# Patient Record
Sex: FEMALE | Race: WHITE | Employment: UNEMPLOYED | URBAN - METROPOLITAN AREA
[De-identification: names, ages, dates, MRNs, and addresses within clinical notes are randomized per-mention and may not be internally consistent; named-entity substitution may affect disease eponyms.]

---

## 2023-02-23 LAB
6-ACETYLMORPHINE: NORMAL
7-AMINOCLONAZEPAM: NORMAL
ALPHA-HYDROXYALPRAZOLAM: NORMAL
ALPHA-HYDROXYMIDAZOLAM: NORMAL
ALPRAZOLAM: NORMAL
AMPHETAMINE (PRESENCE) IN URINE BY SCREEN METHOD: NORMAL
AMPHETAMINES,URINE: NORMAL
BARBITURATES PRESENCE IN URINE BY SCREEN METHOD: NORMAL
BENZODIAZEPINE (PRESENCE) IN URINE BY SCREEN METHOD: NORMAL
BUPRENORPHINE ,URINE: NORMAL
BUPRENORPHINE GLUC, URINE: NORMAL
CANNABINOIDS IN URINE BY SCREEN METHOD: NORMAL
CHLORDIAZEPOXIDE: NORMAL
CLONAZEPAM: NORMAL
COCAINE (PRESENCE) IN URINE BY SCREEN METHOD: NORMAL
CODEINE: NORMAL
DIAZEPAM: NORMAL
DRUG SCREEN COMMENT URINE: NORMAL
FENTANYL URINE: NORMAL
HYDROCODONE: NORMAL
HYDROMORPHONE: NORMAL
LORAZEPAM: NORMAL
MDA,URINE: NORMAL
MDEA,URINE: NORMAL
MDMA,UR: NORMAL
METHADONE (PRESENCE) IN URINE BY SCREEN METHOD: NORMAL
METHAMPHETAMINE QUANTITATIVE URINE: NORMAL
MIDAZOLAM: NORMAL
MORPHINE URINE: NORMAL
NALOXONE, URINE: NORMAL
NORBUPRENORPHINE GLUC,URINE: NORMAL
NORBUPRENORPHINE, URINE: NORMAL
NORDIAZEPAM: NORMAL
NORHYDROCODONE: NORMAL
NOROXYCODONE: NORMAL
NOROXYMORPHONE URINE: NORMAL
OPIATES (PRESENCE) IN URINE BY SCREEN METHOD: NORMAL
OXAZEPAM: NORMAL
OXYCODONE (PRESENCE) IN URINE BY SCREEN METHOD: NORMAL
OXYCODONE: NORMAL
OXYMORPHONE: NORMAL
PHENCYCLIDINE (PRESENCE) IN URINE BY SCREEN METHOD: NORMAL
PHENTERMINE,UR: NORMAL
TEMAZEPAM: NORMAL

## 2023-03-29 LAB
AMPHETAMINE (PRESENCE) IN URINE BY SCREEN METHOD: ABNORMAL
BARBITURATES PRESENCE IN URINE BY SCREEN METHOD: ABNORMAL
BENZODIAZEPINE (PRESENCE) IN URINE BY SCREEN METHOD: ABNORMAL
CANNABINOIDS IN URINE BY SCREEN METHOD: ABNORMAL
COCAINE (PRESENCE) IN URINE BY SCREEN METHOD: ABNORMAL
DRUG SCREEN COMMENT URINE: ABNORMAL
FENTANYL URINE: ABNORMAL
METHADONE (PRESENCE) IN URINE BY SCREEN METHOD: ABNORMAL
OPIATES (PRESENCE) IN URINE BY SCREEN METHOD: ABNORMAL
OXYCODONE (PRESENCE) IN URINE BY SCREEN METHOD: ABNORMAL
PHENCYCLIDINE (PRESENCE) IN URINE BY SCREEN METHOD: ABNORMAL

## 2023-04-03 LAB
AMPHETAMINES,URINE: >5000 NG/ML
BUPRENORPHINE ,URINE: 5 NG/ML
BUPRENORPHINE GLUC, URINE: >1000 NG/ML
MDA,URINE: <200 NG/ML
MDEA,URINE: <200 NG/ML
MDMA,UR: <200 NG/ML
METHAMPHETAMINE QUANTITATIVE URINE: 3306 NG/ML
NALOXONE, URINE: <100 NG/ML
NORBUPRENORPHINE GLUC,URINE: >1000 NG/ML
NORBUPRENORPHINE, URINE: 602 NG/ML
PHENTERMINE,UR: <200 NG/ML

## 2023-04-04 LAB
6-ACETYLMORPHINE: <25 NG/ML
7-AMINOCLONAZEPAM: <25 NG/ML
ALPHA-HYDROXYALPRAZOLAM: <25 NG/ML
ALPHA-HYDROXYMIDAZOLAM: <25 NG/ML
ALPRAZOLAM: <25 NG/ML
CHLORDIAZEPOXIDE: <25 NG/ML
CLONAZEPAM: <25 NG/ML
CODEINE: <50 NG/ML
DIAZEPAM: <25 NG/ML
HYDROCODONE: <25 NG/ML
HYDROMORPHONE: <25 NG/ML
LORAZEPAM: <25 NG/ML
MIDAZOLAM: <25 NG/ML
MORPHINE URINE: <50 NG/ML
NORDIAZEPAM: <25 NG/ML
NORHYDROCODONE: <25 NG/ML
NOROXYCODONE: <25 NG/ML
OXAZEPAM: <25 NG/ML
OXYCODONE: <25 NG/ML
OXYMORPHONE: <25 NG/ML
TEMAZEPAM: <25 NG/ML

## 2023-04-12 LAB
MISCELLANEUOUS TEST RESULT: NORMAL
NAME OF SENDOUT TEST: NORMAL

## 2023-05-08 LAB
BUPRENORPHINE ,URINE: 17 NG/ML
BUPRENORPHINE GLUC, URINE: >1000 NG/ML
NALOXONE, URINE: <100 NG/ML
NORBUPRENORPHINE GLUC,URINE: >1000 NG/ML
NORBUPRENORPHINE, URINE: 415 NG/ML

## 2023-05-10 LAB
AMPHETAMINES,URINE: >5000 NG/ML
MDA,URINE: <200 NG/ML
MDEA,URINE: <200 NG/ML
MDMA,UR: <200 NG/ML
METHAMPHETAMINE QUANTITATIVE URINE: <200 NG/ML
PHENTERMINE,UR: <200 NG/ML

## 2023-06-02 PROBLEM — F11.20 OPIOID USE DISORDER, SEVERE, DEPENDENCE (MULTI): Status: ACTIVE | Noted: 2023-06-02

## 2023-06-02 PROBLEM — F90.2 ADHD (ATTENTION DEFICIT HYPERACTIVITY DISORDER), COMBINED TYPE: Status: ACTIVE | Noted: 2023-06-02

## 2023-06-02 PROBLEM — M70.61 TROCHANTERIC BURSITIS OF RIGHT HIP: Status: ACTIVE | Noted: 2023-06-02

## 2023-06-02 PROBLEM — F99 INSOMNIA DUE TO OTHER MENTAL DISORDER: Status: ACTIVE | Noted: 2023-06-02

## 2023-06-02 PROBLEM — M25.859 FEMORAL ACETABULAR IMPINGEMENT: Status: ACTIVE | Noted: 2023-06-02

## 2023-06-02 PROBLEM — F17.200 MODERATE TOBACCO USE DISORDER: Status: ACTIVE | Noted: 2023-06-02

## 2023-06-02 PROBLEM — F51.05 INSOMNIA DUE TO OTHER MENTAL DISORDER: Status: ACTIVE | Noted: 2023-06-02

## 2023-06-02 PROBLEM — M25.559 JOINT PAIN, HIP: Status: ACTIVE | Noted: 2023-06-02

## 2023-07-03 LAB
6-ACETYLMORPHINE: <25 NG/ML
7-AMINOCLONAZEPAM: <25 NG/ML
ALPHA-HYDROXYALPRAZOLAM: <25 NG/ML
ALPHA-HYDROXYMIDAZOLAM: <25 NG/ML
ALPRAZOLAM: <25 NG/ML
AMPHETAMINE (PRESENCE) IN URINE BY SCREEN METHOD: ABNORMAL
AMPHETAMINES,URINE: >5000 NG/ML
BARBITURATES PRESENCE IN URINE BY SCREEN METHOD: ABNORMAL
BUPRENORPHINE ,URINE: 3 NG/ML
BUPRENORPHINE GLUC, URINE: 430 NG/ML
CANNABINOIDS IN URINE BY SCREEN METHOD: ABNORMAL
CHLORDIAZEPOXIDE: <25 NG/ML
CLONAZEPAM: <25 NG/ML
COCAINE (PRESENCE) IN URINE BY SCREEN METHOD: ABNORMAL
CODEINE: <50 NG/ML
CREATINE, URINE FOR DRUG: 109.6 MG/DL
DIAZEPAM: <25 NG/ML
DRUG SCREEN COMMENT URINE: ABNORMAL
EDDP: <25 NG/ML
FENTANYL CONFIRMATION, URINE: <2.5 NG/ML
HYDROCODONE: <25 NG/ML
HYDROMORPHONE: <25 NG/ML
LORAZEPAM: <25 NG/ML
MDA,URINE: <200 NG/ML
MDEA,URINE: <200 NG/ML
MDMA,UR: <200 NG/ML
METHADONE CONFIRMATION,URINE: <25 NG/ML
METHAMPHETAMINE QUANTITATIVE URINE: <200 NG/ML
MIDAZOLAM: <25 NG/ML
MORPHINE URINE: <50 NG/ML
NALOXONE, URINE: <100 NG/ML
NORBUPRENORPHINE GLUC,URINE: >1000 NG/ML
NORBUPRENORPHINE, URINE: 448 NG/ML
NORDIAZEPAM: <25 NG/ML
NORFENTANYL: <2.5 NG/ML
NORHYDROCODONE: <25 NG/ML
NOROXYCODONE: <25 NG/ML
O-DESMETHYLTRAMADOL: <50 NG/ML
OXAZEPAM: <25 NG/ML
OXYCODONE: <25 NG/ML
OXYMORPHONE: <25 NG/ML
PHENCYCLIDINE (PRESENCE) IN URINE BY SCREEN METHOD: ABNORMAL
PHENTERMINE,UR: <200 NG/ML
TEMAZEPAM: <25 NG/ML
TRAMADOL: <50 NG/ML
ZOLPIDEM METABOLITE (ZCA): <25 NG/ML
ZOLPIDEM: <25 NG/ML

## 2023-09-27 LAB
6-ACETYLMORPHINE: <25 NG/ML
7-AMINOCLONAZEPAM: <25 NG/ML
ALPHA-HYDROXYALPRAZOLAM: <25 NG/ML
ALPHA-HYDROXYMIDAZOLAM: <25 NG/ML
ALPRAZOLAM: <25 NG/ML
AMPHETAMINE (PRESENCE) IN URINE BY SCREEN METHOD: ABNORMAL
BARBITURATES PRESENCE IN URINE BY SCREEN METHOD: ABNORMAL
CANNABINOIDS IN URINE BY SCREEN METHOD: ABNORMAL
CHLORDIAZEPOXIDE: <25 NG/ML
CLONAZEPAM: <25 NG/ML
COCAINE (PRESENCE) IN URINE BY SCREEN METHOD: ABNORMAL
CODEINE: <50 NG/ML
CREATINE, URINE FOR DRUG: 106 MG/DL
DIAZEPAM: <25 NG/ML
DRUG SCREEN COMMENT URINE: ABNORMAL
EDDP: <25 NG/ML
FENTANYL CONFIRMATION, URINE: <2.5 NG/ML
HYDROCODONE: <25 NG/ML
HYDROMORPHONE: <25 NG/ML
LORAZEPAM: <25 NG/ML
METHADONE CONFIRMATION,URINE: <25 NG/ML
MIDAZOLAM: <25 NG/ML
MORPHINE URINE: <50 NG/ML
NORDIAZEPAM: <25 NG/ML
NORFENTANYL: <2.5 NG/ML
NORHYDROCODONE: <25 NG/ML
NOROXYCODONE: <25 NG/ML
O-DESMETHYLTRAMADOL: <50 NG/ML
OXAZEPAM: <25 NG/ML
OXYCODONE: <25 NG/ML
OXYMORPHONE: <25 NG/ML
PHENCYCLIDINE (PRESENCE) IN URINE BY SCREEN METHOD: ABNORMAL
TEMAZEPAM: <25 NG/ML
TRAMADOL: <50 NG/ML
ZOLPIDEM METABOLITE (ZCA): <25 NG/ML
ZOLPIDEM: <25 NG/ML

## 2023-11-10 DIAGNOSIS — F11.20 OPIOID USE DISORDER, SEVERE, DEPENDENCE (MULTI): ICD-10-CM

## 2023-11-10 DIAGNOSIS — F90.2 ADHD (ATTENTION DEFICIT HYPERACTIVITY DISORDER), COMBINED TYPE: ICD-10-CM

## 2023-11-10 RX ORDER — DEXTROAMPHETAMINE SACCHARATE, AMPHETAMINE ASPARTATE, DEXTROAMPHETAMINE SULFATE AND AMPHETAMINE SULFATE 7.5; 7.5; 7.5; 7.5 MG/1; MG/1; MG/1; MG/1
30 TABLET ORAL
Qty: 60 TABLET | Refills: 0 | Status: SHIPPED | OUTPATIENT
Start: 2023-11-22 | End: 2023-11-10 | Stop reason: DRUGHIGH

## 2023-11-10 RX ORDER — DEXTROAMPHETAMINE SACCHARATE, AMPHETAMINE ASPARTATE, DEXTROAMPHETAMINE SULFATE AND AMPHETAMINE SULFATE 7.5; 7.5; 7.5; 7.5 MG/1; MG/1; MG/1; MG/1
30 TABLET ORAL
Qty: 30 TABLET | Refills: 0 | Status: SHIPPED | OUTPATIENT
Start: 2024-01-17 | End: 2024-03-06 | Stop reason: WASHOUT

## 2023-11-10 RX ORDER — DEXTROAMPHETAMINE SACCHARATE, AMPHETAMINE ASPARTATE, DEXTROAMPHETAMINE SULFATE AND AMPHETAMINE SULFATE 7.5; 7.5; 7.5; 7.5 MG/1; MG/1; MG/1; MG/1
30 TABLET ORAL
Qty: 30 TABLET | Refills: 0 | Status: SHIPPED | OUTPATIENT
Start: 2023-12-20 | End: 2024-03-06 | Stop reason: WASHOUT

## 2023-11-10 RX ORDER — NALOXONE HYDROCHLORIDE 4 MG/.1ML
4 SPRAY NASAL AS NEEDED
Qty: 2 EACH | Refills: 0 | Status: SHIPPED | OUTPATIENT
Start: 2023-11-10 | End: 2024-02-09 | Stop reason: WASHOUT

## 2023-11-10 RX ORDER — BUPRENORPHINE HYDROCHLORIDE, NALOXONE HYDROCHLORIDE 8; 2 MG/1; MG/1
1 FILM, SOLUBLE BUCCAL; SUBLINGUAL 3 TIMES DAILY
Qty: 90 FILM | Refills: 2 | Status: SHIPPED | OUTPATIENT
Start: 2023-12-02 | End: 2024-02-13 | Stop reason: SDUPTHER

## 2023-11-10 RX ORDER — DEXTROAMPHETAMINE SACCHARATE, AMPHETAMINE ASPARTATE, DEXTROAMPHETAMINE SULFATE AND AMPHETAMINE SULFATE 7.5; 7.5; 7.5; 7.5 MG/1; MG/1; MG/1; MG/1
30 TABLET ORAL
Qty: 30 TABLET | Refills: 0 | Status: SHIPPED | OUTPATIENT
Start: 2023-11-22 | End: 2024-02-13 | Stop reason: SDUPTHER

## 2023-11-10 RX ORDER — DEXTROAMPHETAMINE SACCHARATE, AMPHETAMINE ASPARTATE MONOHYDRATE, DEXTROAMPHETAMINE SULFATE AND AMPHETAMINE SULFATE 7.5; 7.5; 7.5; 7.5 MG/1; MG/1; MG/1; MG/1
30 CAPSULE, EXTENDED RELEASE ORAL EVERY MORNING
Qty: 30 CAPSULE | Refills: 0 | Status: SHIPPED | OUTPATIENT
Start: 2023-11-22 | End: 2024-02-13 | Stop reason: SDUPTHER

## 2023-11-10 RX ORDER — DEXTROAMPHETAMINE SACCHARATE, AMPHETAMINE ASPARTATE, DEXTROAMPHETAMINE SULFATE AND AMPHETAMINE SULFATE 7.5; 7.5; 7.5; 7.5 MG/1; MG/1; MG/1; MG/1
30 TABLET ORAL
Qty: 60 TABLET | Refills: 0 | Status: SHIPPED | OUTPATIENT
Start: 2024-01-17 | End: 2023-11-10 | Stop reason: DRUGHIGH

## 2023-11-10 RX ORDER — DEXTROAMPHETAMINE SACCHARATE, AMPHETAMINE ASPARTATE MONOHYDRATE, DEXTROAMPHETAMINE SULFATE AND AMPHETAMINE SULFATE 7.5; 7.5; 7.5; 7.5 MG/1; MG/1; MG/1; MG/1
30 CAPSULE, EXTENDED RELEASE ORAL EVERY MORNING
Qty: 30 CAPSULE | Refills: 0 | Status: SHIPPED | OUTPATIENT
Start: 2024-01-17 | End: 2024-06-05 | Stop reason: WASHOUT

## 2023-11-10 RX ORDER — DEXTROAMPHETAMINE SACCHARATE, AMPHETAMINE ASPARTATE, DEXTROAMPHETAMINE SULFATE AND AMPHETAMINE SULFATE 7.5; 7.5; 7.5; 7.5 MG/1; MG/1; MG/1; MG/1
30 TABLET ORAL
Qty: 60 TABLET | Refills: 0 | Status: SHIPPED | OUTPATIENT
Start: 2023-12-20 | End: 2023-11-10 | Stop reason: DRUGHIGH

## 2023-11-10 RX ORDER — DEXTROAMPHETAMINE SACCHARATE, AMPHETAMINE ASPARTATE MONOHYDRATE, DEXTROAMPHETAMINE SULFATE AND AMPHETAMINE SULFATE 7.5; 7.5; 7.5; 7.5 MG/1; MG/1; MG/1; MG/1
30 CAPSULE, EXTENDED RELEASE ORAL EVERY MORNING
Qty: 30 CAPSULE | Refills: 0 | Status: SHIPPED | OUTPATIENT
Start: 2023-12-20 | End: 2024-03-06 | Stop reason: WASHOUT

## 2023-11-10 RX ORDER — BUPRENORPHINE HYDROCHLORIDE, NALOXONE HYDROCHLORIDE 8; 2 MG/1; MG/1
1 FILM, SOLUBLE BUCCAL; SUBLINGUAL 3 TIMES DAILY
COMMUNITY
End: 2023-11-10 | Stop reason: SDUPTHER

## 2023-11-25 ENCOUNTER — HOSPITAL ENCOUNTER (EMERGENCY)
Age: 43
Discharge: HOME | End: 2023-11-25
Attending: EMERGENCY MEDICINE
Payer: COMMERCIAL

## 2023-11-25 ENCOUNTER — APPOINTMENT (OUTPATIENT)
Dept: GENERAL RADIOLOGY | Age: 43
End: 2023-11-25
Payer: COMMERCIAL

## 2023-11-25 VITALS
TEMPERATURE: 97.7 F | HEART RATE: 82 BPM | SYSTOLIC BLOOD PRESSURE: 132 MMHG | HEIGHT: 65 IN | WEIGHT: 175 LBS | OXYGEN SATURATION: 100 % | DIASTOLIC BLOOD PRESSURE: 85 MMHG | BODY MASS INDEX: 29.16 KG/M2 | RESPIRATION RATE: 18 BRPM

## 2023-11-25 DIAGNOSIS — J20.9 ACUTE BRONCHITIS, UNSPECIFIED ORGANISM: Primary | ICD-10-CM

## 2023-11-25 DIAGNOSIS — N39.0 ACUTE UTI: ICD-10-CM

## 2023-11-25 LAB
BACTERIA URNS QL MICRO: ABNORMAL /HPF
BILIRUB UR QL STRIP: ABNORMAL
CLARITY UR: CLEAR
COLOR UR: YELLOW
EPI CELLS #/AREA URNS HPF: ABNORMAL /HPF
GLUCOSE UR STRIP-MCNC: NEGATIVE MG/DL
HGB UR QL STRIP: NEGATIVE
INFLUENZA A BY PCR: NEGATIVE
INFLUENZA B BY PCR: NEGATIVE
KETONES UR STRIP-MCNC: ABNORMAL MG/DL
LEUKOCYTE ESTERASE UR QL STRIP: NEGATIVE
NITRITE UR QL STRIP: POSITIVE
PH UR STRIP: 5.5 [PH] (ref 5–9)
PROT UR STRIP-MCNC: 30 MG/DL
RBC #/AREA URNS HPF: ABNORMAL /HPF (ref 0–2)
SARS-COV-2 RDRP RESP QL NAA+PROBE: NOT DETECTED
SP GR UR STRIP: >=1.03 (ref 1–1.03)
STREP GRP A PCR: NEGATIVE
URINE REFLEX TO CULTURE: ABNORMAL
UROBILINOGEN UR STRIP-ACNC: 1 E.U./DL
WBC #/AREA URNS HPF: ABNORMAL /HPF (ref 0–5)

## 2023-11-25 PROCEDURE — 87186 SC STD MICRODIL/AGAR DIL: CPT

## 2023-11-25 PROCEDURE — 71045 X-RAY EXAM CHEST 1 VIEW: CPT

## 2023-11-25 PROCEDURE — 87635 SARS-COV-2 COVID-19 AMP PRB: CPT

## 2023-11-25 PROCEDURE — 87502 INFLUENZA DNA AMP PROBE: CPT

## 2023-11-25 PROCEDURE — 81001 URINALYSIS AUTO W/SCOPE: CPT

## 2023-11-25 PROCEDURE — 87088 URINE BACTERIA CULTURE: CPT

## 2023-11-25 PROCEDURE — 99284 EMERGENCY DEPT VISIT MOD MDM: CPT

## 2023-11-25 PROCEDURE — 87651 STREP A DNA AMP PROBE: CPT

## 2023-11-25 PROCEDURE — 87086 URINE CULTURE/COLONY COUNT: CPT

## 2023-11-25 RX ORDER — GUAIFENESIN 600 MG/1
1200 TABLET, EXTENDED RELEASE ORAL 2 TIMES DAILY
Qty: 40 TABLET | Refills: 0 | Status: SHIPPED | OUTPATIENT
Start: 2023-11-25 | End: 2023-12-05

## 2023-11-25 RX ORDER — DEXAMETHASONE 4 MG/1
4 TABLET ORAL 2 TIMES DAILY WITH MEALS
Qty: 20 TABLET | Refills: 0 | Status: SHIPPED | OUTPATIENT
Start: 2023-11-25 | End: 2023-12-05

## 2023-11-25 RX ORDER — DEXTROAMPHETAMINE SACCHARATE, AMPHETAMINE ASPARTATE, DEXTROAMPHETAMINE SULFATE AND AMPHETAMINE SULFATE 7.5; 7.5; 7.5; 7.5 MG/1; MG/1; MG/1; MG/1
30 TABLET ORAL DAILY
Qty: 30 TABLET | Refills: 2 | COMMUNITY
Start: 2023-11-22 | End: 2024-02-16

## 2023-11-25 RX ORDER — AZITHROMYCIN 250 MG/1
TABLET, FILM COATED ORAL
Qty: 1 PACKET | Refills: 0 | Status: SHIPPED | OUTPATIENT
Start: 2023-11-25 | End: 2023-11-29

## 2023-11-25 RX ORDER — BUPRENORPHINE AND NALOXONE 8; 2 MG/1; MG/1
1 FILM, SOLUBLE BUCCAL; SUBLINGUAL 3 TIMES DAILY
Qty: 90 FILM | Refills: 2 | COMMUNITY
Start: 2023-12-02 | End: 2024-03-01

## 2023-11-25 ASSESSMENT — ENCOUNTER SYMPTOMS
PHOTOPHOBIA: 0
EYE PAIN: 0
SHORTNESS OF BREATH: 1
NAUSEA: 0
COUGH: 1
BACK PAIN: 0
ABDOMINAL PAIN: 0
CONSTIPATION: 0
VOMITING: 0
WHEEZING: 0
COLOR CHANGE: 0
SINUS PRESSURE: 0
ABDOMINAL DISTENTION: 0
APNEA: 0
DIARRHEA: 0
VOICE CHANGE: 0
RHINORRHEA: 0
SORE THROAT: 0

## 2023-11-25 ASSESSMENT — PAIN DESCRIPTION - DESCRIPTORS: DESCRIPTORS: SORE

## 2023-11-25 ASSESSMENT — PAIN DESCRIPTION - PAIN TYPE: TYPE: ACUTE PAIN

## 2023-11-25 ASSESSMENT — PAIN DESCRIPTION - ORIENTATION: ORIENTATION: MID

## 2023-11-25 ASSESSMENT — PAIN SCALES - GENERAL: PAINLEVEL_OUTOF10: 5

## 2023-11-25 ASSESSMENT — PAIN DESCRIPTION - FREQUENCY: FREQUENCY: CONTINUOUS

## 2023-11-25 ASSESSMENT — PAIN DESCRIPTION - LOCATION: LOCATION: BACK;CHEST

## 2023-11-25 ASSESSMENT — PAIN - FUNCTIONAL ASSESSMENT: PAIN_FUNCTIONAL_ASSESSMENT: 0-10

## 2023-11-25 NOTE — ED NOTES
Pt was given d/c instructions and informed to  three e scripts. Pt voiced understanding of d/c instructions without further questions.       Ana Maria Santacruz RN  11/25/23 0898

## 2023-11-25 NOTE — ED PROVIDER NOTES
[facility] ED  eMERGENCY dEPARTMENT eNCOUnter      Pt Name: Carlos Ortega  MRN: [id number]  [address] [facility] 1980  Date of evaluation: 11/25/2023  Provider: Eber Godwin MD    [address]       Chief Complaint   Patient presents with    Cough     Cough causing pain in chest x 1 week         HISTORY OF PRESENT ILLNESS   (Location/Symptom, Timing/Onset,Context/Setting, Quality, Duration, Modifying Factors, Severity)  Note limiting factors. Carlos Ortega is a 39 y.o. female who presents to the emergency department with complaint of cough of 1 week duration. Chest pain with coughing. Patient is a smoker 1 pack of cigarettes a day. No known sick contacts. Denies palpitations, orthopnea or PND. Denies fever or chills. Cough is nonproductive. HPI    Nursing Notes were reviewed. REVIEW OF SYSTEMS    (2-9 systems for level 4, 10 or more for level 5)     Review of Systems   Constitutional: Negative. Negative for activity change, appetite change, chills, fatigue and fever. HENT:  Positive for congestion. Negative for ear discharge, ear pain, hearing loss, rhinorrhea, sinus pressure, sore throat and voice change. Eyes:  Negative for photophobia, pain and visual disturbance. Respiratory:  Positive for cough and shortness of breath. Negative for apnea and wheezing. Cardiovascular:  Negative for chest pain, palpitations and leg swelling. Gastrointestinal:  Negative for abdominal distention, abdominal pain, constipation, diarrhea, nausea and vomiting. Endocrine: Negative for cold intolerance, heat intolerance and polyuria. Genitourinary:  Negative for dysuria, flank pain, frequency and urgency. Musculoskeletal:  Negative for arthralgias, back pain, gait problem, myalgias and neck stiffness. Skin:  Negative for color change, pallor, rash and wound. Allergic/Immunologic: Negative for food allergies and immunocompromised state.    Neurological:  Negative for dizziness, tremors,

## 2023-11-27 LAB
BACTERIA UR CULT: ABNORMAL
BACTERIA UR CULT: ABNORMAL
ORGANISM: ABNORMAL

## 2023-11-27 NOTE — RESULT ENCOUNTER NOTE
Awaiting sensitivity Quality 226: Preventive Care And Screening: Tobacco Use: Screening And Cessation Intervention: Patient screened for tobacco use and is an ex/non-smoker

## 2023-12-07 RX ORDER — IBUPROFEN 600 MG/1
600 TABLET ORAL EVERY 8 HOURS PRN
COMMUNITY
Start: 2023-07-11

## 2023-12-07 RX ORDER — DEXTROAMPHETAMINE SACCHARATE, AMPHETAMINE ASPARTATE, DEXTROAMPHETAMINE SULFATE AND AMPHETAMINE SULFATE 7.5; 7.5; 7.5; 7.5 MG/1; MG/1; MG/1; MG/1
TABLET ORAL
COMMUNITY
Start: 2022-09-12 | End: 2024-03-06 | Stop reason: WASHOUT

## 2023-12-07 RX ORDER — NALOXONE HYDROCHLORIDE 4 MG/.1ML
SPRAY NASAL
COMMUNITY
Start: 2022-08-24 | End: 2023-12-13 | Stop reason: SDUPTHER

## 2023-12-07 RX ORDER — NICOTINE 21 MG/24H
PATCH, EXTENDED RELEASE TRANSDERMAL
COMMUNITY

## 2023-12-07 RX ORDER — VARENICLINE TARTRATE 1 MG/1
1 TABLET, FILM COATED ORAL 2 TIMES DAILY
COMMUNITY
Start: 2023-06-28 | End: 2024-01-10 | Stop reason: WASHOUT

## 2023-12-07 RX ORDER — DEXTROAMPHETAMINE SACCHARATE, AMPHETAMINE ASPARTATE MONOHYDRATE, DEXTROAMPHETAMINE SULFATE AND AMPHETAMINE SULFATE 7.5; 7.5; 7.5; 7.5 MG/1; MG/1; MG/1; MG/1
CAPSULE, EXTENDED RELEASE ORAL
COMMUNITY
Start: 2022-09-12 | End: 2024-03-06 | Stop reason: WASHOUT

## 2023-12-07 RX ORDER — ACETAMINOPHEN 500 MG
500 TABLET ORAL EVERY 8 HOURS PRN
COMMUNITY
Start: 2023-07-11 | End: 2024-06-05 | Stop reason: WASHOUT

## 2023-12-07 RX ORDER — VARENICLINE TARTRATE 0.5 (11)-1
KIT ORAL
COMMUNITY
Start: 2023-02-22 | End: 2024-01-10 | Stop reason: WASHOUT

## 2023-12-07 RX ORDER — AMOXICILLIN 500 MG/1
500 CAPSULE ORAL EVERY 8 HOURS
COMMUNITY
Start: 2023-07-11 | End: 2024-01-10 | Stop reason: WASHOUT

## 2023-12-07 RX ORDER — TERBINAFINE HYDROCHLORIDE 250 MG/1
TABLET ORAL
COMMUNITY
Start: 2023-05-08 | End: 2024-01-10 | Stop reason: WASHOUT

## 2023-12-13 ENCOUNTER — OFFICE VISIT (OUTPATIENT)
Dept: BEHAVIORAL HEALTH | Facility: CLINIC | Age: 43
End: 2023-12-13
Payer: COMMERCIAL

## 2023-12-13 DIAGNOSIS — Z79.899 MEDICATION MANAGEMENT: ICD-10-CM

## 2023-12-13 DIAGNOSIS — Z00.00 HEALTHCARE MAINTENANCE: ICD-10-CM

## 2023-12-13 DIAGNOSIS — Z00.00 HEALTH CARE MAINTENANCE: ICD-10-CM

## 2023-12-13 DIAGNOSIS — F11.20 OPIOID USE DISORDER, SEVERE, DEPENDENCE (MULTI): ICD-10-CM

## 2023-12-13 DIAGNOSIS — F90.2 ADHD (ATTENTION DEFICIT HYPERACTIVITY DISORDER), COMBINED TYPE: ICD-10-CM

## 2023-12-13 DIAGNOSIS — F17.200 MODERATE TOBACCO USE DISORDER: ICD-10-CM

## 2023-12-13 PROCEDURE — 4004F PT TOBACCO SCREEN RCVD TLK: CPT | Performed by: CLINICAL NURSE SPECIALIST

## 2023-12-13 PROCEDURE — 80324 DRUG SCREEN AMPHETAMINES 1/2: CPT | Performed by: CLINICAL NURSE SPECIALIST

## 2023-12-13 PROCEDURE — 99214 OFFICE O/P EST MOD 30 MIN: CPT | Performed by: CLINICAL NURSE SPECIALIST

## 2023-12-13 PROCEDURE — 80348 DRUG SCREENING BUPRENORPHINE: CPT | Performed by: CLINICAL NURSE SPECIALIST

## 2023-12-13 PROCEDURE — 80307 DRUG TEST PRSMV CHEM ANLYZR: CPT | Performed by: CLINICAL NURSE SPECIALIST

## 2023-12-13 NOTE — PROGRESS NOTES
Outpatient Psychiatry      Subjective   Madhavi Brownlee, is a 43 y.o. female,  seen in follow-up.      Assessment/Plan   Problem List Items Addressed This Visit             ICD-10-CM    ADHD (attention deficit hyperactivity disorder), combined type F90.2     Tolerating and benefiting from current regimen. No indication for medication changes today.    Continue Adderall XR 30 mg PO daily and Adderall IR 30 mg PO in the afternoon- refill sent to pharmacy today.   UDS collected in office today.       Reviewed OARRS, no discrepancies or concerns. Discussed risk of insomnia, anxiety, agitation, anorexia, autonomic effects, toxicity, psychosis, dependence, tolerance, abuse.          Moderate tobacco use disorder F17.200     Educated re: the health risks associated with the use of nicotine/tobacco products, and counseled on the importance of cessation. Discussed options for nicotine replacement and/or pharmacotherapies to aid in cessation (e.g. varenicline, bupropion). Not currently interested in cessation assistance.           Opioid use disorder, severe, dependence (CMS/[facility]) F11.20     Tolerating and benefiting from current regimen. No indication for medication changes today.    Continue Suboxone 8-2 mg SL TID- refill sent to pharmacy today.   Discussed that long term, plan will be to decrease Suboxone dose to within recommended dosing range for tx of OUD (16 mg total daily or less). She has been at current dose for several years, previously managed by an addictions psychiatrist. Will defer changes to dosing today given mutliple psychosocial stressors contributing to risk.  UDS collected in office today.     Reviewed OARRS, no discrepancies or concerns. Discussed risk of motor/cognitive impairment, sedation, dependence, tolerance, abuse, withdrawal sequelae, accidental overdose, life-threatening respiratory depression (nolan. in combination with alcohol, benzodiazepines and/or other opioids), excess sedation in  combination with other sedating medicines.              Other Visit Diagnoses         Codes    Medication management     Z79.899    Relevant Orders    Buprenorphine Confirm,Urine (Completed)    Opiate/Opioid/Benzo Prescription Compliance    Amphetamine Confirm, Urine    Amphetamine Confirm, Urine (Completed)    Healthcare maintenance     Z00.00    Relevant Orders    Buprenorphine Confirm,Urine (Completed)    Opiate/Opioid/Benzo Prescription Compliance    Amphetamine Confirm, Urine (Completed)    Health care maintenance     Z00.00    Relevant Orders    Amphetamine Confirm, Urine            Follow-up in 3 months.     Risk Assessment:  Risk Assessment: Madhavi Brownlee is currently a low acute risk of suicide and self-harm due to no past suicide attempt(s) and not currently endorsing thoughts of suicide. Madhavi Brownlee is currently a low acute risk of violence and harm to others due to no past history of violence and not currently threatening others.  Suicidal Risk Factors:  and multiple family stressors.   Violence Risk Factors: none  Protective Factors: strong coping skills, sense of responsibility towards family, social support/connectedness, moral objections to suicide, child related concerns/living with child <18 years, positive family relationships, hopefulness/future orientation, and marriage/partnership    Reason for Visit:  Follow-up for medication management.     HPI:  Since her last visit,     Things have been awful.   Dad's health is getting worse- can barely walk now.     Daughter relapsed- signed over custody of son to son's father, already had signed over custody of other child to him years ago.     Son dealing with legal issues, stemming from charges that were already dropped months ago. Today is initial hearing-  has indicated the charges should be dropped.     She had covid last month- got really sick, developed a kidney infection., took a long time to recover.     Doing well with  "mental health medications and with sobriety, despite stressors. \"I know I have to be strong and keep it together because right now everything else is falling apart.\"     Denies suicidal ideation or a passive death wish.     No new medications or health problems.       Current Psychiatric Medications:  Suboxone 8-2 mg SL TID  Adderall XR 30 mg PO daily  Adderall IR 30 mg PO daily in the afternoon    Record Review: moderate     Medical Review Of Systems:  Pertinent items are noted in HPI.    Psychiatric Review Of Systems:  As noted in HPI.        Objective   Mental Status Exam  General Appearance: Well groomed, appropriate eye contact  Attitude/Behavior: Cooperative  Motor: No psychomotor agitation or retardation, no tremor or other abnormal movements  Speech: Normal rate, volume, prosody  Gait/Station: WFL - Within functional limits  Mood: \"Things have been a mess.\"  Affect: Anxious, Constricted, Congruent with mood and topic of conversation  Thought Process: Linear, goal directed  Thought Associations: No loosening of associations  Thought Content: Other: (comment) (anxious themes r/t family stressorrs r/t father's progressing illness, daughter's recent relapse, financial strain.)  Perception: No perceptual abnormalities noted  Sensorium: Alert and oriented to person, place, time and situation  Insight: Intact  Judgement: Intact  Cognition: Cognitively intact to conversational testing with respect to attention, orientation, fund of knowledge, recent and remote memory, and language    Vitals:  There were no vitals filed for this visit.    Labs:  No visits with results within 2 Month(s) from this visit.   Latest known visit with results is:   Orders Only on 09/20/2023   Component Date Value    Amphetamines,Urine 09/20/2023 >5000     MDA, Urine 09/20/2023 <200     MDEA, Urine 09/20/2023 <200     MDMA, Urine 09/20/2023 <200     Methamphetamine Quant, Ur 09/20/2023 <200     Phentermine,Urine 09/20/2023 <200     DRUG " SCREEN COMMENT URINE 09/20/2023 SEE BELOW     Creatine, Urine 09/20/2023 106.0     Amphetamine Screen, Urine 09/20/2023 PRESUMPTIVE POSITIVE (A)     Barbiturate Screen, Urine 09/20/2023 PRESUMPTIVE NEGATIVE     Cannabinoid Screen, Urine 09/20/2023 PRESUMPTIVE NEGATIVE     Cocaine Screen, Urine 09/20/2023 PRESUMPTIVE NEGATIVE     PCP Screen, Urine 09/20/2023 PRESUMPTIVE NEGATIVE     7-Aminoclonazepam 09/20/2023 <25     Alpha-Hydroxyalprazolam 09/20/2023 <25     Alpha-Hydroxymidazolam 09/20/2023 <25     Alprazolam 09/20/2023 <25     Chlordiazepoxide 09/20/2023 <25     Clonazepam 09/20/2023 <25     Diazepam 09/20/2023 <25     Lorazepam 09/20/2023 <25     Midazolam 09/20/2023 <25     Nordiazepam 09/20/2023 <25     Oxazepam 09/20/2023 <25     Temazepam 09/20/2023 <25     Zolpidem 09/20/2023 <25     Zolpidem Metabolite (ZCA) 09/20/2023 <25     6-Acetylmorphine 09/20/2023 <25     Codeine 09/20/2023 <50     Hydrocodone 09/20/2023 <25     Hydromorphone 09/20/2023 <25     Morphine Urine 09/20/2023 <50     Norhydrocodone 09/20/2023 <25     Noroxycodone 09/20/2023 <25     Oxycodone 09/20/2023 <25     Oxymorphone 09/20/2023 <25     Tramadol 09/20/2023 <50     O-Desmethyltramadol 09/20/2023 <50     Fentanyl 09/20/2023 <2.5     Norfentanyl 09/20/2023 <2.5     METHADONE CONFIRMATION,U* 09/20/2023 <25     EDDP 09/20/2023 <25        Brandi Howard, APRN-CNP, APRN-CNS

## 2023-12-14 LAB
AMPHETAMINES UR QL SCN: ABNORMAL
BARBITURATES UR QL SCN: ABNORMAL
BZE UR QL SCN: ABNORMAL
CANNABINOIDS UR QL SCN: ABNORMAL
CREAT UR-MCNC: 195.1 MG/DL (ref 20–320)
PCP UR QL SCN: ABNORMAL

## 2023-12-16 LAB
AMPHET UR-MCNC: >5000 NG/ML
MDA UR-MCNC: <200 NG/ML
MDEA UR-MCNC: <200 NG/ML
MDMA UR-MCNC: <200 NG/ML
METHAMPHET UR-MCNC: <200 NG/ML
PHENTERMINE UR CFM-MCNC: <200 NG/ML

## 2023-12-17 LAB
BUPRENORPHINE UR-MCNC: 8 NG/ML
BUPRENORPHINE UR-MCNC: 849 NG/ML
NALOXONE UR CFM-MCNC: <100 NG/ML
NORBUPRENORPHINE UR CFM-MCNC: >1000 NG/ML
NORBUPRENORPHINE UR-MCNC: 788 NG/ML

## 2024-01-09 NOTE — ASSESSMENT & PLAN NOTE
Tolerating and benefiting from current regimen. No indication for medication changes today.    Continue Suboxone 8-2 mg SL TID- refill sent to pharmacy today.   Discussed that long term, plan will be to decrease Suboxone dose to within recommended dosing range for tx of OUD (16 mg total daily or less). She has been at current dose for several years, previously managed by an addictions psychiatrist. Will defer changes to dosing today given mutliple psychosocial stressors contributing to risk.  UDS collected in office today.     Reviewed OARRS, no discrepancies or concerns. Discussed risk of motor/cognitive impairment, sedation, dependence, tolerance, abuse, withdrawal sequelae, accidental overdose, life-threatening respiratory depression (nolan. in combination with alcohol, benzodiazepines and/or other opioids), excess sedation in combination with other sedating medicines.

## 2024-01-09 NOTE — ASSESSMENT & PLAN NOTE
Educated re: the health risks associated with the use of nicotine/tobacco products, and counseled on the importance of cessation. Discussed options for nicotine replacement and/or pharmacotherapies to aid in cessation (e.g. varenicline, bupropion). Not currently interested in cessation assistance.

## 2024-01-09 NOTE — ASSESSMENT & PLAN NOTE
Tolerating and benefiting from current regimen. No indication for medication changes today.    Continue Adderall XR 30 mg PO daily and Adderall IR 30 mg PO in the afternoon- refill sent to pharmacy today.   UDS collected in office today.       Reviewed OARRS, no discrepancies or concerns. Discussed risk of insomnia, anxiety, agitation, anorexia, autonomic effects, toxicity, psychosis, dependence, tolerance, abuse.

## 2024-01-10 ENCOUNTER — OFFICE VISIT (OUTPATIENT)
Dept: PRIMARY CARE | Facility: CLINIC | Age: 44
End: 2024-01-10
Payer: COMMERCIAL

## 2024-01-10 VITALS
WEIGHT: 167 LBS | DIASTOLIC BLOOD PRESSURE: 60 MMHG | HEIGHT: 65 IN | TEMPERATURE: 98.3 F | BODY MASS INDEX: 27.82 KG/M2 | SYSTOLIC BLOOD PRESSURE: 120 MMHG | HEART RATE: 81 BPM

## 2024-01-10 DIAGNOSIS — R07.9 CHEST PAIN, UNSPECIFIED TYPE: Primary | ICD-10-CM

## 2024-01-10 DIAGNOSIS — F90.2 ADHD (ATTENTION DEFICIT HYPERACTIVITY DISORDER), COMBINED TYPE: ICD-10-CM

## 2024-01-10 DIAGNOSIS — M25.559 ARTHRALGIA OF HIP, UNSPECIFIED LATERALITY: ICD-10-CM

## 2024-01-10 DIAGNOSIS — F11.20 OPIOID USE DISORDER, SEVERE, DEPENDENCE (MULTI): ICD-10-CM

## 2024-01-10 DIAGNOSIS — M25.859 FEMORAL ACETABULAR IMPINGEMENT: ICD-10-CM

## 2024-01-10 DIAGNOSIS — Z76.89 ENCOUNTER TO ESTABLISH CARE: ICD-10-CM

## 2024-01-10 DIAGNOSIS — F17.200 MODERATE TOBACCO USE DISORDER: ICD-10-CM

## 2024-01-10 DIAGNOSIS — R06.09 DOE (DYSPNEA ON EXERTION): ICD-10-CM

## 2024-01-10 PROCEDURE — 99205 OFFICE O/P NEW HI 60 MIN: CPT | Performed by: FAMILY MEDICINE

## 2024-01-10 PROCEDURE — 93000 ELECTROCARDIOGRAM COMPLETE: CPT | Performed by: FAMILY MEDICINE

## 2024-01-10 ASSESSMENT — ENCOUNTER SYMPTOMS
DYSURIA: 0
ABDOMINAL PAIN: 0
PALPITATIONS: 0
HEADACHES: 0
JOINT SWELLING: 0
CONSTIPATION: 0
UNEXPECTED WEIGHT CHANGE: 0
FEVER: 0
HEMATURIA: 0
ARTHRALGIAS: 0
LIGHT-HEADEDNESS: 0
FLANK PAIN: 0
NERVOUS/ANXIOUS: 0
SORE THROAT: 0
AGITATION: 0
FATIGUE: 0
DIARRHEA: 0
COUGH: 0
MYALGIAS: 0
NAUSEA: 0
DEPRESSION: 0
WHEEZING: 0
SHORTNESS OF BREATH: 0

## 2024-01-10 ASSESSMENT — PATIENT HEALTH QUESTIONNAIRE - PHQ9
SUM OF ALL RESPONSES TO PHQ9 QUESTIONS 1 AND 2: 0
2. FEELING DOWN, DEPRESSED OR HOPELESS: NOT AT ALL
1. LITTLE INTEREST OR PLEASURE IN DOING THINGS: NOT AT ALL

## 2024-01-10 NOTE — PROGRESS NOTES
Subjective   Chief complaint: Madhavi Brownlee is a 43 y.o. female who presents for New Patient Visit.    HPI:  HPI  Complains of chest pain.  Intermittent.  Once or twice a week.  Occurs with exercise.  Occurs with exertion.  Feels heavy.  Gets better when she rests.  It lasts a couple minutes.  She had an episode of pain into her left arm.  No palpitations.  Gets sweaty.  No vomiting.  She has not been evaluated in the past.  At this point organ to do an EKG to make sure no acute.  We can do a comprehensive laboratory assessment.  Echocardiogram.  Stress test.  Chest x-ray.  Short-term follow-up.  Encourage smoking cessation and risk factor modification.  She does have risk factors for heart disease.  Past Medical History:   Diagnosis Date    Personal history of other infectious and parasitic diseases 10/31/2018    History of hepatitis C virus infection   History reviewed. No pertinent surgical history.   Family History   Problem Relation Name Age of Onset    ADD / ADHD Daughter      Other (opioid type drug dependence) Daughter      Other (Other) Mother's Sister      ADD / ADHD Mother's Sister      Alcohol abuse Mother's Sister      Other (Other) Mother's Brother      ADD / ADHD Mother's Brother      Alcohol abuse Mother's Brother      Suicide Attempts Other        Social History     Socioeconomic History    Marital status: Legally      Spouse name: Not on file    Number of children: Not on file    Years of education: Not on file    Highest education level: Not on file   Occupational History    Not on file   Tobacco Use    Smoking status: Every Day     Packs/day: 1     Types: Cigarettes    Smokeless tobacco: Never   Substance and Sexual Activity    Alcohol use: Not Currently    Drug use: Not Currently    Sexual activity: Not on file   Other Topics Concern    Not on file   Social History Narrative    Not on file     Social Determinants of Health     Financial Resource Strain: Not on file   Food Insecurity:  "Not on file   Transportation Needs: Not on file   Physical Activity: Not on file   Stress: Not on file   Social Connections: Not on file   Intimate Partner Violence: Not on file   Housing Stability: Not on file      Objective   /60 (BP Location: Right arm, Patient Position: Sitting, BP Cuff Size: Large adult)   Pulse 81   Temp 36.8 °C (98.3 °F) (Temporal)   Ht 1.651 m (5' 5\")   Wt 75.8 kg (167 lb)   BMI 27.79 kg/m²   Physical Exam  Vitals and nursing note reviewed.   Constitutional:       General: She is not in acute distress.     Appearance: She is not ill-appearing or toxic-appearing.   HENT:      Head: Normocephalic and atraumatic.      Mouth/Throat:      Pharynx: No oropharyngeal exudate or posterior oropharyngeal erythema.   Eyes:      Extraocular Movements: Extraocular movements intact.      Conjunctiva/sclera: Conjunctivae normal.      Pupils: Pupils are equal, round, and reactive to light.   Cardiovascular:      Rate and Rhythm: Normal rate and regular rhythm.      Pulses: Normal pulses.      Heart sounds: Normal heart sounds. No murmur heard.  Pulmonary:      Effort: Pulmonary effort is normal.      Breath sounds: Normal breath sounds. No wheezing, rhonchi or rales.   Abdominal:      General: Abdomen is flat. Bowel sounds are normal. There is no distension.      Palpations: Abdomen is soft. There is no mass.      Tenderness: There is no abdominal tenderness.      Hernia: No hernia is present.   Musculoskeletal:         General: No swelling or deformity. Normal range of motion.      Cervical back: Normal range of motion and neck supple.   Skin:     General: Skin is warm and dry.      Capillary Refill: Capillary refill takes less than 2 seconds.      Coloration: Skin is not jaundiced.      Findings: No erythema or rash.   Neurological:      General: No focal deficit present.      Mental Status: She is oriented to person, place, and time. Mental status is at baseline.   Psychiatric:         Mood and " Affect: Mood normal.         Behavior: Behavior normal.         Thought Content: Thought content normal.         Judgment: Judgment normal.       Review of Systems   Constitutional:  Negative for fatigue, fever and unexpected weight change.   HENT:  Negative for congestion and sore throat.    Respiratory:  Negative for cough, shortness of breath and wheezing.    Cardiovascular:  Negative for chest pain, palpitations and leg swelling.   Gastrointestinal:  Negative for abdominal pain, constipation, diarrhea and nausea.   Genitourinary:  Negative for dysuria, flank pain and hematuria.   Musculoskeletal:  Negative for arthralgias, joint swelling and myalgias.   Skin:  Negative for rash.   Neurological:  Negative for syncope, light-headedness and headaches.   Psychiatric/Behavioral:  Negative for agitation. The patient is not nervous/anxious.       I have reviewed and reconciled the medication list with the patient today.   Current Outpatient Medications:     acetaminophen (Tylenol) 500 mg tablet, Take 1 tablet (500 mg) by mouth every 8 hours if needed., Disp: , Rfl:     amphetamine-dextroamphetamine (Adderall) 30 mg tablet, Take 1 tablet (30 mg) by mouth once daily at noon. Take before meals.. Do not start before December 20, 2023., Disp: 30 tablet, Rfl: 0    [START ON 1/17/2024] amphetamine-dextroamphetamine (Adderall) 30 mg tablet, Take 1 tablet (30 mg) by mouth once daily at noon. Take before meals.. Do not start before January 17, 2024., Disp: 30 tablet, Rfl: 0    amphetamine-dextroamphetamine (Adderall) 30 mg tablet, Take by mouth once daily., Disp: , Rfl:     amphetamine-dextroamphetamine XR (Adderall XR) 30 mg 24 hr capsule, Take 1 capsule (30 mg) by mouth once daily in the morning. Do not crush or chew. Do not start before December 20, 2023., Disp: 30 capsule, Rfl: 0    [START ON 1/17/2024] amphetamine-dextroamphetamine XR (Adderall XR) 30 mg 24 hr capsule, Take 1 capsule (30 mg) by mouth once daily in the  "morning. Do not crush or chew. Do not start before January 17, 2024., Disp: 30 capsule, Rfl: 0    amphetamine-dextroamphetamine XR (Adderall XR) 30 mg 24 hr capsule, Take by mouth once daily., Disp: , Rfl:     ibuprofen 600 mg tablet, Take 1 tablet (600 mg) by mouth every 8 hours if needed., Disp: , Rfl:     naloxone (Narcan) 4 mg/0.1 mL nasal spray, Administer 1 spray (4 mg) into affected nostril(s) if needed for opioid reversal. May repeat every 2-3 minutes if needed, alternating nostrils, until medical assistance becomes available., Disp: 2 each, Rfl: 0    Nicoderm CQ 21 mg/24 hr patch, apply 1 patch to CLEAN, DRY, AND INTACT SKIN once daily REMOVE ev...  (REFER TO PRESCRIPTION NOTES)., Disp: , Rfl:     Suboxone 8-2 mg SL film, Place 1 Film under the tongue 3 times a day. Do not start before December 2, 2023., Disp: 90 Film, Rfl: 2    amphetamine-dextroamphetamine (Adderall) 30 mg tablet, Take 1 tablet (30 mg) by mouth once daily at noon. Take before meals.. Do not start before November 22, 2023., Disp: 30 tablet, Rfl: 0    amphetamine-dextroamphetamine XR (Adderall XR) 30 mg 24 hr capsule, Take 1 capsule (30 mg) by mouth once daily in the morning. Do not crush or chew. Do not start before November 22, 2023., Disp: 30 capsule, Rfl: 0     Imaging:  No results found.     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    Joint pain, hip M25.559    Moderate tobacco use disorder F17.200    Opioid use disorder, severe, dependence (CMS/[facility]) F11.20     Other Visit Diagnoses         Codes    Chest pain, unspecified type    -  Primary R07.9    Relevant Orders    ECG 12 lead " (Clinic Performed)    XR chest 2 views    Transthoracic Echo (TTE) Complete    Nuclear Stress Test    CBC    Tsh With Reflex To Free T4 If Abnormal    Troponin I, High Sensitivity    Encounter to establish care     Z76.89    Relevant Orders    Comprehensive metabolic panel    Lipid panel    AGUILAR (dyspnea on exertion)     R06.09            Reinforced lifestyle modifications  Continue current medications as listed  Physical activity as tolerated and healthy diet encouraged  Maintain a healthy weight  Follow up in 2 weeks

## 2024-01-12 ENCOUNTER — HOSPITAL ENCOUNTER (OUTPATIENT)
Dept: RADIOLOGY | Facility: HOSPITAL | Age: 44
Discharge: HOME | End: 2024-01-12
Payer: COMMERCIAL

## 2024-01-12 ENCOUNTER — LAB (OUTPATIENT)
Dept: LAB | Facility: LAB | Age: 44
End: 2024-01-12
Payer: COMMERCIAL

## 2024-01-12 DIAGNOSIS — R07.9 CHEST PAIN, UNSPECIFIED TYPE: ICD-10-CM

## 2024-01-12 DIAGNOSIS — Z76.89 ENCOUNTER TO ESTABLISH CARE: ICD-10-CM

## 2024-01-12 LAB
ALBUMIN SERPL BCP-MCNC: 4.2 G/DL (ref 3.4–5)
ALP SERPL-CCNC: 69 U/L (ref 33–110)
ALT SERPL W P-5'-P-CCNC: 24 U/L (ref 7–45)
ANION GAP SERPL CALC-SCNC: 14 MMOL/L (ref 10–20)
AST SERPL W P-5'-P-CCNC: 22 U/L (ref 9–39)
BILIRUB SERPL-MCNC: 0.4 MG/DL (ref 0–1.2)
BUN SERPL-MCNC: 18 MG/DL (ref 6–23)
CALCIUM SERPL-MCNC: 8.9 MG/DL (ref 8.6–10.3)
CARDIAC TROPONIN I PNL SERPL HS: <3 NG/L (ref 0–13)
CHLORIDE SERPL-SCNC: 103 MMOL/L (ref 98–107)
CHOLEST SERPL-MCNC: 154 MG/DL (ref 0–199)
CHOLESTEROL/HDL RATIO: 3.1
CO2 SERPL-SCNC: 26 MMOL/L (ref 21–32)
CREAT SERPL-MCNC: 0.78 MG/DL (ref 0.5–1.05)
EGFRCR SERPLBLD CKD-EPI 2021: >90 ML/MIN/1.73M*2
ERYTHROCYTE [DISTWIDTH] IN BLOOD BY AUTOMATED COUNT: 14 % (ref 11.5–14.5)
GLUCOSE SERPL-MCNC: 97 MG/DL (ref 74–99)
HCT VFR BLD AUTO: 40.5 % (ref 36–46)
HDLC SERPL-MCNC: 50.2 MG/DL
HGB BLD-MCNC: 12.9 G/DL (ref 12–16)
LDLC SERPL CALC-MCNC: 90 MG/DL
MCH RBC QN AUTO: 28.9 PG (ref 26–34)
MCHC RBC AUTO-ENTMCNC: 31.9 G/DL (ref 32–36)
MCV RBC AUTO: 91 FL (ref 80–100)
NON HDL CHOLESTEROL: 104 MG/DL (ref 0–149)
NRBC BLD-RTO: 0 /100 WBCS (ref 0–0)
PLATELET # BLD AUTO: 291 X10*3/UL (ref 150–450)
POTASSIUM SERPL-SCNC: 4.5 MMOL/L (ref 3.5–5.3)
PROT SERPL-MCNC: 7 G/DL (ref 6.4–8.2)
RBC # BLD AUTO: 4.47 X10*6/UL (ref 4–5.2)
SODIUM SERPL-SCNC: 138 MMOL/L (ref 136–145)
T4 FREE SERPL-MCNC: 0.53 NG/DL (ref 0.61–1.12)
TRIGL SERPL-MCNC: 69 MG/DL (ref 0–149)
TSH SERPL-ACNC: 14.29 MIU/L (ref 0.44–3.98)
VLDL: 14 MG/DL (ref 0–40)
WBC # BLD AUTO: 10.9 X10*3/UL (ref 4.4–11.3)

## 2024-01-12 PROCEDURE — 36415 COLL VENOUS BLD VENIPUNCTURE: CPT

## 2024-01-12 PROCEDURE — 84484 ASSAY OF TROPONIN QUANT: CPT

## 2024-01-12 PROCEDURE — 84443 ASSAY THYROID STIM HORMONE: CPT

## 2024-01-12 PROCEDURE — 80053 COMPREHEN METABOLIC PANEL: CPT

## 2024-01-12 PROCEDURE — 85027 COMPLETE CBC AUTOMATED: CPT

## 2024-01-12 PROCEDURE — 71046 X-RAY EXAM CHEST 2 VIEWS: CPT | Performed by: RADIOLOGY

## 2024-01-12 PROCEDURE — 80061 LIPID PANEL: CPT

## 2024-01-12 PROCEDURE — 84439 ASSAY OF FREE THYROXINE: CPT

## 2024-01-12 PROCEDURE — 71046 X-RAY EXAM CHEST 2 VIEWS: CPT

## 2024-01-30 ENCOUNTER — APPOINTMENT (OUTPATIENT)
Dept: CARDIOLOGY | Facility: CLINIC | Age: 44
End: 2024-01-30
Payer: COMMERCIAL

## 2024-01-30 ENCOUNTER — APPOINTMENT (OUTPATIENT)
Dept: RADIOLOGY | Facility: CLINIC | Age: 44
End: 2024-01-30
Payer: COMMERCIAL

## 2024-01-30 ENCOUNTER — HOSPITAL ENCOUNTER (OUTPATIENT)
Dept: CARDIOLOGY | Facility: CLINIC | Age: 44
Discharge: HOME | End: 2024-01-30
Payer: COMMERCIAL

## 2024-01-30 DIAGNOSIS — R07.9 CHEST PAIN, UNSPECIFIED TYPE: ICD-10-CM

## 2024-01-30 LAB
AORTIC VALVE MEAN GRADIENT: 10 MMHG
AORTIC VALVE PEAK VELOCITY: 1.97 M/S
AV PEAK GRADIENT: 15.5 MMHG
AVA (PEAK VEL): 1.63 CM2
AVA (VTI): 1.45 CM2
EJECTION FRACTION APICAL 4 CHAMBER: 64.5
LEFT VENTRICLE INTERNAL DIMENSION DIASTOLE: 4.63 CM (ref 3.5–6)
LEFT VENTRICULAR OUTFLOW TRACT DIAMETER: 2 CM
MITRAL VALVE E/A RATIO: 1.05
RIGHT VENTRICLE PEAK SYSTOLIC PRESSURE: 28.2 MMHG

## 2024-01-30 PROCEDURE — 93306 TTE W/DOPPLER COMPLETE: CPT

## 2024-01-30 PROCEDURE — 93306 TTE W/DOPPLER COMPLETE: CPT | Performed by: INTERNAL MEDICINE

## 2024-02-09 ENCOUNTER — OFFICE VISIT (OUTPATIENT)
Dept: PRIMARY CARE | Facility: CLINIC | Age: 44
End: 2024-02-09
Payer: COMMERCIAL

## 2024-02-09 VITALS
HEART RATE: 86 BPM | TEMPERATURE: 97.9 F | BODY MASS INDEX: 28.09 KG/M2 | OXYGEN SATURATION: 97 % | SYSTOLIC BLOOD PRESSURE: 122 MMHG | WEIGHT: 168.6 LBS | HEIGHT: 65 IN | DIASTOLIC BLOOD PRESSURE: 84 MMHG

## 2024-02-09 DIAGNOSIS — R07.9 CHEST PAIN, UNSPECIFIED TYPE: ICD-10-CM

## 2024-02-09 DIAGNOSIS — E01.0 THYROMEGALY: ICD-10-CM

## 2024-02-09 DIAGNOSIS — R06.09 DOE (DYSPNEA ON EXERTION): ICD-10-CM

## 2024-02-09 DIAGNOSIS — F17.200 MODERATE TOBACCO USE DISORDER: Primary | ICD-10-CM

## 2024-02-09 DIAGNOSIS — E03.9 HYPOTHYROIDISM, UNSPECIFIED TYPE: ICD-10-CM

## 2024-02-09 PROCEDURE — 99214 OFFICE O/P EST MOD 30 MIN: CPT | Performed by: FAMILY MEDICINE

## 2024-02-09 RX ORDER — LEVOTHYROXINE SODIUM 25 UG/1
25 TABLET ORAL DAILY
Qty: 30 TABLET | Refills: 11 | Status: SHIPPED | OUTPATIENT
Start: 2024-02-09 | End: 2024-03-25 | Stop reason: SDUPTHER

## 2024-02-09 NOTE — PROGRESS NOTES
"Subjective   Chief complaint: Madhavi Brownlee is a 43 y.o. female who presents for Follow-up and Results (Patient here for follow-up on test results. Patient stated that insurance will not the stress test ordered, regular exercise stress test may be covered. ).    HPI:  HPI  FU LAST VISIT  NO FURTHER CHEST PAIN  STILL SOME SOB WITH EXERTION  WALKING OR STAIRS  OCCAS WHEEZE    ADMITS TO COLD INTOLERANCE  BUT ALSO SEEMS TO SWEAT  IS RAPIDLY LOSING WEIGHT  NOT INTENTIONAL  DRY SKIN  FATIGUE  NOT AWARE OF ANY THYROID FAM HX    TESTS NOTED  CXR NML  TSH AND T4 ABNML  OW LABS NORMAL  ECHO WAS NORMAL  STRESS TEST WAS DENIED    Objective   /84 (BP Location: Left arm, Patient Position: Standing, BP Cuff Size: Adult)   Pulse 86   Temp 36.6 °C (97.9 °F) (Temporal)   Ht 1.651 m (5' 5\")   Wt 76.5 kg (168 lb 9.6 oz)   SpO2 97%   BMI 28.06 kg/m²   Physical Exam  Vitals and nursing note reviewed.   Constitutional:       Appearance: Normal appearance.   HENT:      Head: Normocephalic and atraumatic.   Eyes:      Extraocular Movements: Extraocular movements intact.      Conjunctiva/sclera: Conjunctivae normal.      Pupils: Pupils are equal, round, and reactive to light.   Cardiovascular:      Rate and Rhythm: Normal rate and regular rhythm.      Heart sounds: Normal heart sounds.   Pulmonary:      Effort: Pulmonary effort is normal.      Breath sounds: Normal breath sounds.   Abdominal:      General: Abdomen is flat. Bowel sounds are normal.      Palpations: Abdomen is soft.   Musculoskeletal:         General: Normal range of motion.   Skin:     General: Skin is warm and dry.   Neurological:      General: No focal deficit present.      Mental Status: She is alert and oriented to person, place, and time. Mental status is at baseline.   Psychiatric:         Mood and Affect: Mood normal.         Behavior: Behavior normal.     PALPABLE TMG    Review of Systems   I have reviewed and reconciled the medication list with the " patient today.   Current Outpatient Medications:     acetaminophen (Tylenol) 500 mg tablet, Take 1 tablet (500 mg) by mouth every 8 hours if needed., Disp: , Rfl:     amphetamine-dextroamphetamine (Adderall) 30 mg tablet, Take 1 tablet (30 mg) by mouth once daily at noon. Take before meals.. Do not start before January 17, 2024., Disp: 30 tablet, Rfl: 0    amphetamine-dextroamphetamine (Adderall) 30 mg tablet, Take by mouth once daily., Disp: , Rfl:     amphetamine-dextroamphetamine XR (Adderall XR) 30 mg 24 hr capsule, Take 1 capsule (30 mg) by mouth once daily in the morning. Do not crush or chew. Do not start before January 17, 2024., Disp: 30 capsule, Rfl: 0    amphetamine-dextroamphetamine XR (Adderall XR) 30 mg 24 hr capsule, Take by mouth once daily., Disp: , Rfl:     ibuprofen 600 mg tablet, Take 1 tablet (600 mg) by mouth every 8 hours if needed., Disp: , Rfl:     Nicoderm CQ 21 mg/24 hr patch, apply 1 patch to CLEAN, DRY, AND INTACT SKIN once daily REMOVE ev...  (REFER TO PRESCRIPTION NOTES)., Disp: , Rfl:     Suboxone 8-2 mg SL film, Place 1 Film under the tongue 3 times a day. Do not start before December 2, 2023., Disp: 90 Film, Rfl: 2    amphetamine-dextroamphetamine (Adderall) 30 mg tablet, Take 1 tablet (30 mg) by mouth once daily at noon. Take before meals.. Do not start before November 22, 2023., Disp: 30 tablet, Rfl: 0    amphetamine-dextroamphetamine (Adderall) 30 mg tablet, Take 1 tablet (30 mg) by mouth once daily at noon. Take before meals.. Do not start before December 20, 2023., Disp: 30 tablet, Rfl: 0    amphetamine-dextroamphetamine XR (Adderall XR) 30 mg 24 hr capsule, Take 1 capsule (30 mg) by mouth once daily in the morning. Do not crush or chew. Do not start before November 22, 2023., Disp: 30 capsule, Rfl: 0    amphetamine-dextroamphetamine XR (Adderall XR) 30 mg 24 hr capsule, Take 1 capsule (30 mg) by mouth once daily in the morning. Do not crush or chew. Do not start before  December 20, 2023., Disp: 30 capsule, Rfl: 0    levothyroxine (Synthroid, Levoxyl) 25 mcg tablet, Take 1 tablet (25 mcg) by mouth once daily., Disp: 30 tablet, Rfl: 11     Imaging:  Transthoracic Echo (TTE) Complete    Result Date: 1/30/2024              [address], [address], [place] [address]          Tel [phone] Fax [phone] TRANSTHORACIC ECHOCARDIOGRAM REPORT  Patient Name:      MADYSON Rodarte Physician:    [id number] Sujata Patel MD Study Date:        1/30/2024           Ordering Provider:    [id number] DIEGO DE LA O MRN/PID:           [id number]            Fellow: Accession#:        [id number]        Nurse: Date of Birth/Age: 1980 / 43 years Sonographer:          Lenka Perales RDCS, RDMS, RVT Gender:            F                   Additional Staff: Height:            165.10 cm           Admit Date: Weight:            75.75 kg            Admission Status:     Outpatient BSA:               1.83 m2             Department Location:  [facility] Study Type:    TRANSTHORACIC ECHO (TTE) COMPLETE Diagnosis/ICD: Chest pain, unspecified-R07.9 Indication:    CP, SOB CPT Codes:     Echo Complete w Full Doppler-[id number]  Study Detail: The following Echo studies were performed: 2D, M-Mode, Doppler and               color flow.  PHYSICIAN INTERPRETATION: Left Ventricle: Left ventricular systolic function is normal, with an estimated ejection fraction of 60-65%. There are no regional wall motion abnormalities. The left ventricular cavity size is normal. There is mild concentric left ventricular hypertrophy. Spectral Doppler shows a normal pattern of left ventricular diastolic filling. Left Atrium: The  left atrium is normal in size. Right Ventricle: The right ventricle is normal in size. There is normal right ventricular global systolic function. Right Atrium: The right atrium is normal in size. Aortic Valve: The aortic valve is trileaflet. There is mild aortic valve regurgitation. The peak instantaneous gradient of the aortic valve is 15.5 mmHg. The mean gradient of the aortic valve is 10.0 mmHg. Mitral Valve: The mitral valve is mildly thickened. There is no evidence of mitral valve regurgitation. Tricuspid Valve: The tricuspid valve is structurally normal. There is trace to mild tricuspid regurgitation. Pulmonic Valve: The pulmonic valve is structurally normal. There is no indication of pulmonic valve regurgitation. Pericardium: There is no pericardial effusion noted. Aorta: The aortic root is normal. Systemic Veins: The inferior vena cava appears mildly dilated. There is IVC inspiratory collapse greater than 50%.  CONCLUSIONS:  1. Left ventricular systolic function is normal with a 60-65% estimated ejection fraction.  2. Trace to mild tricuspid regurgitation.  3. Mild aortic valve regurgitation. QUANTITATIVE DATA SUMMARY: 2D MEASUREMENTS:                           Normal Ranges: Ao Root d:     2.55 cm    (2.0-3.7cm) LAs:           2.90 cm    (2.7-4.0cm) RVIDd:         2.01 cm    (0.9-3.6cm) IVSd:          1.19 cm    (0.6-1.1cm) LVPWd:         1.24 cm    (0.6-1.1cm) LVIDd:         4.63 cm    (3.9-5.9cm) LVIDs:         3.08 cm LV Mass Index: 115.1 g/m2 LV % FS        33.5 % AORTA MEASUREMENTS:                    Normal Ranges: Asc Ao, d: 3.20 cm (2.1-3.4cm) LV SYSTOLIC FUNCTION BY 2D PLANIMETRY (MOD):                     Normal Ranges: EF-A4C View: 64.5 % (>=55%) LV DIASTOLIC FUNCTION:                        Normal Ranges: MV Peak E:    0.89 m/s (0.7-1.2 m/s) MV Peak A:    0.84 m/s (0.42-0.7 m/s) E/A Ratio:    1.05     (1.0-2.2) MV lateral e' 0.14 m/s MV medial e'  0.08 m/s MITRAL VALVE:                       Normal Ranges: MV Vmax:    1.11 m/s (<=1.3m/s) MV peak P.9 mmHg (<5mmHg) MV mean P.0 mmHg (<48mmHg) MV DT:      275 msec (150-240msec) AORTIC VALVE:                                    Normal Ranges: AoV Vmax:                1.97 m/s  (<=1.7m/s) AoV Peak PG:             15.5 mmHg (<20mmHg) AoV Mean PG:             10.0 mmHg (1.7-11.5mmHg) LVOT Max Yunior:            1.02 m/s  (<=1.1m/s) AoV VTI:                 43.40 cm  (18-25cm) LVOT VTI:                20.00 cm LVOT Diameter:           2.00 cm   (1.8-2.4cm) AoV Area, VTI:           1.45 cm2  (2.5-5.5cm2) AoV Area,Vmax:           1.63 cm2  (2.5-4.5cm2) AoV Dimensionless Index: 0.46 AORTIC INSUFFICIENCY: AI Vmax:       5.02 m/s AI Half-time:  378 msec AI Decel Rate: 362.00 cm/s2 TRICUSPID VALVE/RVSP:                             Normal Ranges: Peak TR Velocity: 2.51 m/s RV Syst Pressure: 28.2 mmHg (< 30mmHg) PULMONIC VALVE:                      Normal Ranges: PV Max Yunior: 0.9 m/s  (0.6-0.9m/s) PV Max PG:  3.6 mmHg  [id number] Sujata Patel MD Electronically signed on 2024 at 1:37:43 PM  ** Final **     XR chest 2 views    Result Date: 2024  Interpreted By:  Ivan Morales, STUDY: XR CHEST 2 VIEWS   INDICATION: Signs/Symptoms:chest pain.   COMPARISON: 2013   ACCESSION NUMBER(S): [id number]   ORDERING CLINICIAN: DIEGO DE LA O   FINDINGS: No consolidation, effusion, edema, or pneumothorax. Heart size within normal limits.       No evidence of acute intrathoracic abnormality.   Signed by: Ivan Morales 2024 9:31 AM Dictation workstation:   [id number]       Labs reviewed:    Lab Results   Component Value Date    WBC 10.9 2024    HGB 12.9 2024    HCT 40.5 2024     2024    CHOL 154 2024    TRIG 69 2024    HDL 50.2 2024    ALT 24 2024    AST 22 2024     2024    K 4.5 2024     2024    CREATININE 0.78 2024    BUN 18 2024    CO2 26 2024     TSH 14.29 (H) 01/12/2024       Assessment/Plan   Problem List Items Addressed This Visit             ICD-10-CM    Moderate tobacco use disorder - Primary F17.200     Other Visit Diagnoses         Codes    Chest pain, unspecified type     R07.9    Relevant Orders    Stress Test    AGUILAR (dyspnea on exertion)     R06.09    Relevant Orders    Stress Test    Hypothyroidism, unspecified type     E03.9    Relevant Medications    levothyroxine (Synthroid, Levoxyl) 25 mcg tablet    Other Relevant Orders    US thyroid    TSH    T4, free    Thyromegaly     E01.0    Relevant Orders    US thyroid    TSH    T4, free        PROCEED WITH GXT  CHECK THY US  START LEVOTHY  TITRATE PRN  FU 4 WEEKS  LABS PRIOR    Reinforced lifestyle modifications  Continue current medications as listed  Physical activity as tolerated and healthy diet encouraged  Maintain a healthy weight  Follow up in 4-6 WEEKS

## 2024-02-13 DIAGNOSIS — F90.2 ADHD (ATTENTION DEFICIT HYPERACTIVITY DISORDER), COMBINED TYPE: ICD-10-CM

## 2024-02-13 DIAGNOSIS — F11.20 OPIOID USE DISORDER, SEVERE, DEPENDENCE (MULTI): ICD-10-CM

## 2024-02-13 RX ORDER — DEXTROAMPHETAMINE SACCHARATE, AMPHETAMINE ASPARTATE, DEXTROAMPHETAMINE SULFATE AND AMPHETAMINE SULFATE 7.5; 7.5; 7.5; 7.5 MG/1; MG/1; MG/1; MG/1
30 TABLET ORAL
Qty: 30 TABLET | Refills: 0 | Status: SHIPPED | OUTPATIENT
Start: 2024-02-25 | End: 2024-03-06 | Stop reason: WASHOUT

## 2024-02-13 RX ORDER — BUPRENORPHINE HYDROCHLORIDE, NALOXONE HYDROCHLORIDE 8; 2 MG/1; MG/1
1 FILM, SOLUBLE BUCCAL; SUBLINGUAL 3 TIMES DAILY
Qty: 90 FILM | Refills: 2 | Status: SHIPPED | OUTPATIENT
Start: 2024-02-25 | End: 2024-05-20

## 2024-02-13 RX ORDER — NALOXONE HYDROCHLORIDE 4 MG/.1ML
4 SPRAY NASAL AS NEEDED
Qty: 2 EACH | Refills: 0 | Status: SHIPPED | OUTPATIENT
Start: 2024-02-13 | End: 2024-03-06 | Stop reason: SDUPTHER

## 2024-02-13 RX ORDER — DEXTROAMPHETAMINE SACCHARATE, AMPHETAMINE ASPARTATE MONOHYDRATE, DEXTROAMPHETAMINE SULFATE AND AMPHETAMINE SULFATE 7.5; 7.5; 7.5; 7.5 MG/1; MG/1; MG/1; MG/1
30 CAPSULE, EXTENDED RELEASE ORAL EVERY MORNING
Qty: 30 CAPSULE | Refills: 0 | Status: SHIPPED | OUTPATIENT
Start: 2024-02-25 | End: 2024-03-06 | Stop reason: WASHOUT

## 2024-02-23 ENCOUNTER — HOSPITAL ENCOUNTER (OUTPATIENT)
Dept: RADIOLOGY | Facility: HOSPITAL | Age: 44
Discharge: HOME | End: 2024-02-23
Payer: COMMERCIAL

## 2024-02-23 ENCOUNTER — HOSPITAL ENCOUNTER (OUTPATIENT)
Dept: CARDIOLOGY | Facility: HOSPITAL | Age: 44
Discharge: HOME | End: 2024-02-23
Payer: COMMERCIAL

## 2024-02-23 DIAGNOSIS — R07.9 CHEST PAIN, UNSPECIFIED TYPE: ICD-10-CM

## 2024-02-23 DIAGNOSIS — E01.0 THYROMEGALY: ICD-10-CM

## 2024-02-23 DIAGNOSIS — R06.00 DYSPNEA, UNSPECIFIED: ICD-10-CM

## 2024-02-23 DIAGNOSIS — E03.9 HYPOTHYROIDISM, UNSPECIFIED TYPE: ICD-10-CM

## 2024-02-23 DIAGNOSIS — R06.09 DOE (DYSPNEA ON EXERTION): ICD-10-CM

## 2024-02-23 PROCEDURE — 93018 CV STRESS TEST I&R ONLY: CPT | Performed by: INTERNAL MEDICINE

## 2024-02-23 PROCEDURE — 76536 US EXAM OF HEAD AND NECK: CPT | Performed by: STUDENT IN AN ORGANIZED HEALTH CARE EDUCATION/TRAINING PROGRAM

## 2024-02-23 PROCEDURE — 93017 CV STRESS TEST TRACING ONLY: CPT

## 2024-02-23 PROCEDURE — 93016 CV STRESS TEST SUPVJ ONLY: CPT | Performed by: INTERNAL MEDICINE

## 2024-02-23 PROCEDURE — 76536 US EXAM OF HEAD AND NECK: CPT

## 2024-02-26 DIAGNOSIS — F11.20 OPIOID USE DISORDER, SEVERE, DEPENDENCE (MULTI): ICD-10-CM

## 2024-02-26 RX ORDER — NALOXONE HYDROCHLORIDE 4 MG/.1ML
SPRAY NASAL
Qty: 2 EACH | Refills: 0 | OUTPATIENT
Start: 2024-02-26

## 2024-03-06 ENCOUNTER — OFFICE VISIT (OUTPATIENT)
Dept: BEHAVIORAL HEALTH | Facility: CLINIC | Age: 44
End: 2024-03-06
Payer: COMMERCIAL

## 2024-03-06 VITALS
DIASTOLIC BLOOD PRESSURE: 90 MMHG | HEIGHT: 65 IN | HEART RATE: 78 BPM | BODY MASS INDEX: 28.32 KG/M2 | WEIGHT: 170 LBS | SYSTOLIC BLOOD PRESSURE: 152 MMHG

## 2024-03-06 DIAGNOSIS — F17.200 MODERATE TOBACCO USE DISORDER: ICD-10-CM

## 2024-03-06 DIAGNOSIS — F90.2 ADHD (ATTENTION DEFICIT HYPERACTIVITY DISORDER), COMBINED TYPE: ICD-10-CM

## 2024-03-06 DIAGNOSIS — F11.20 OPIOID USE DISORDER, SEVERE, DEPENDENCE (MULTI): ICD-10-CM

## 2024-03-06 PROCEDURE — 99214 OFFICE O/P EST MOD 30 MIN: CPT | Performed by: CLINICAL NURSE SPECIALIST

## 2024-03-06 RX ORDER — DEXTROAMPHETAMINE SACCHARATE, AMPHETAMINE ASPARTATE, DEXTROAMPHETAMINE SULFATE AND AMPHETAMINE SULFATE 7.5; 7.5; 7.5; 7.5 MG/1; MG/1; MG/1; MG/1
30 TABLET ORAL
Qty: 30 TABLET | Refills: 0 | Status: SHIPPED | OUTPATIENT
Start: 2024-03-24 | End: 2024-06-05 | Stop reason: WASHOUT

## 2024-03-06 RX ORDER — DEXTROAMPHETAMINE SACCHARATE, AMPHETAMINE ASPARTATE, DEXTROAMPHETAMINE SULFATE AND AMPHETAMINE SULFATE 7.5; 7.5; 7.5; 7.5 MG/1; MG/1; MG/1; MG/1
30 TABLET ORAL
Qty: 30 TABLET | Refills: 0 | Status: SHIPPED | OUTPATIENT
Start: 2024-04-21 | End: 2024-06-05 | Stop reason: WASHOUT

## 2024-03-06 RX ORDER — DEXTROAMPHETAMINE SACCHARATE, AMPHETAMINE ASPARTATE MONOHYDRATE, DEXTROAMPHETAMINE SULFATE AND AMPHETAMINE SULFATE 7.5; 7.5; 7.5; 7.5 MG/1; MG/1; MG/1; MG/1
30 CAPSULE, EXTENDED RELEASE ORAL EVERY MORNING
Qty: 30 CAPSULE | Refills: 0 | Status: SHIPPED | OUTPATIENT
Start: 2024-04-21 | End: 2024-06-05 | Stop reason: WASHOUT

## 2024-03-06 RX ORDER — DEXTROAMPHETAMINE SACCHARATE, AMPHETAMINE ASPARTATE MONOHYDRATE, DEXTROAMPHETAMINE SULFATE AND AMPHETAMINE SULFATE 7.5; 7.5; 7.5; 7.5 MG/1; MG/1; MG/1; MG/1
30 CAPSULE, EXTENDED RELEASE ORAL EVERY MORNING
Qty: 30 CAPSULE | Refills: 0 | Status: SHIPPED | OUTPATIENT
Start: 2024-03-24 | End: 2024-06-05 | Stop reason: WASHOUT

## 2024-03-06 RX ORDER — NALOXONE HYDROCHLORIDE 4 MG/.1ML
4 SPRAY NASAL AS NEEDED
Qty: 2 EACH | Refills: 0 | Status: SHIPPED | OUTPATIENT
Start: 2024-03-06 | End: 2024-04-23 | Stop reason: WASHOUT

## 2024-03-06 RX ORDER — DEXTROAMPHETAMINE SACCHARATE, AMPHETAMINE ASPARTATE MONOHYDRATE, DEXTROAMPHETAMINE SULFATE AND AMPHETAMINE SULFATE 7.5; 7.5; 7.5; 7.5 MG/1; MG/1; MG/1; MG/1
30 CAPSULE, EXTENDED RELEASE ORAL EVERY MORNING
Qty: 30 CAPSULE | Refills: 0 | Status: SHIPPED | OUTPATIENT
Start: 2024-05-19 | End: 2024-06-18

## 2024-03-06 RX ORDER — DEXTROAMPHETAMINE SACCHARATE, AMPHETAMINE ASPARTATE, DEXTROAMPHETAMINE SULFATE AND AMPHETAMINE SULFATE 7.5; 7.5; 7.5; 7.5 MG/1; MG/1; MG/1; MG/1
30 TABLET ORAL
Qty: 30 TABLET | Refills: 0 | Status: SHIPPED | OUTPATIENT
Start: 2024-05-19 | End: 2024-06-18

## 2024-03-06 NOTE — ASSESSMENT & PLAN NOTE
Tolerating and benefiting from current regimen. No indication for medication changes today.    Continue Suboxone 8-2 mg SL TID- no refill needed today.    Discussed that long term, plan will be to decrease Suboxone dose to within recommended dosing range for tx of OUD (16 mg total daily or less). She has been at current dose for several years, previously managed by an addictions psychiatrist. Will defer changes to dosing today given mutliple psychosocial stressors contributing to risk.  Reviewed UDS from 12/13/2023- consistent with current rx, no discrepancies. Next UDS to be collected at follow up.   Rx for naloxone nasal spray sent to pharmacy today.     Reviewed OARRS, no discrepancies or concerns. Discussed risk of motor/cognitive impairment, sedation, dependence, tolerance, abuse, withdrawal sequelae, accidental overdose, life-threatening respiratory depression (nolan. in combination with alcohol, benzodiazepines and/or other opioids), excess sedation in combination with other sedating medicines.

## 2024-03-06 NOTE — ASSESSMENT & PLAN NOTE
Tolerating and benefiting from current regimen. No indication for medication changes today.    Continue Adderall XR 30 mg PO daily and Adderall IR 30 mg PO in the afternoon- refills placed on file at pharmacy today.   Reviewed UDS from 12/13/2023- consistent with current medications, no discrepancies. Next UDS to be completed at follow up.     Reviewed OARRS, no discrepancies or concerns. Discussed risk of insomnia, anxiety, agitation, anorexia, autonomic effects, toxicity, psychosis, dependence, tolerance, abuse.

## 2024-03-08 ENCOUNTER — OFFICE VISIT (OUTPATIENT)
Dept: PRIMARY CARE | Facility: CLINIC | Age: 44
End: 2024-03-08
Payer: COMMERCIAL

## 2024-03-08 VITALS
HEART RATE: 75 BPM | DIASTOLIC BLOOD PRESSURE: 84 MMHG | SYSTOLIC BLOOD PRESSURE: 134 MMHG | WEIGHT: 170.25 LBS | BODY MASS INDEX: 28.36 KG/M2 | OXYGEN SATURATION: 92 % | TEMPERATURE: 97.8 F | HEIGHT: 65 IN

## 2024-03-08 DIAGNOSIS — R06.09 DOE (DYSPNEA ON EXERTION): Primary | ICD-10-CM

## 2024-03-08 DIAGNOSIS — E03.9 HYPOTHYROIDISM, UNSPECIFIED TYPE: ICD-10-CM

## 2024-03-08 DIAGNOSIS — E01.0 THYROMEGALY: ICD-10-CM

## 2024-03-08 DIAGNOSIS — R07.9 CHEST PAIN, UNSPECIFIED TYPE: ICD-10-CM

## 2024-03-08 PROCEDURE — 99213 OFFICE O/P EST LOW 20 MIN: CPT | Performed by: FAMILY MEDICINE

## 2024-03-08 ASSESSMENT — PATIENT HEALTH QUESTIONNAIRE - PHQ9
2. FEELING DOWN, DEPRESSED OR HOPELESS: NOT AT ALL
SUM OF ALL RESPONSES TO PHQ9 QUESTIONS 1 AND 2: 0
1. LITTLE INTEREST OR PLEASURE IN DOING THINGS: NOT AT ALL

## 2024-03-08 NOTE — PROGRESS NOTES
"Subjective   Chief complaint: Madhavi Brownlee is a 43 y.o. female who presents for Follow-up (Patient in office today for 3 week follow up. ).    HPI:  HPI  Chronic disease management.  Test results reviewed.  Stress test was normal.  Thyroid ultrasound shows thyroiditis.  She is on the medicine.  She is on levothyroxine.  Tolerating.  Actually states she feels better.  Symptoms are improving.  No side effects.  For now we will continue her current dose of levothyroxine.  Will check TSH free T4 now.  Adjust dose as indicated plan on a 6-month follow-up with labs prior to that as well.  Otherwise in the meantime continue current medical therapy reinforced lifestyle modifications physical activity as tolerated healthy diet medication compliance encouraged.  Objective   /84 (BP Location: Left arm, Patient Position: Sitting, BP Cuff Size: Adult)   Pulse 75   Temp 36.6 °C (97.8 °F) (Temporal)   Ht 1.651 m (5' 5\")   Wt 77.2 kg (170 lb 4 oz)   SpO2 92%   BMI 28.33 kg/m²   Physical Exam  Vitals and nursing note reviewed.   Constitutional:       Appearance: Normal appearance.   HENT:      Head: Normocephalic and atraumatic.   Eyes:      Extraocular Movements: Extraocular movements intact.      Conjunctiva/sclera: Conjunctivae normal.      Pupils: Pupils are equal, round, and reactive to light.   Cardiovascular:      Rate and Rhythm: Normal rate and regular rhythm.      Heart sounds: Normal heart sounds.   Pulmonary:      Effort: Pulmonary effort is normal.      Breath sounds: Normal breath sounds.   Abdominal:      General: Abdomen is flat. Bowel sounds are normal.      Palpations: Abdomen is soft.   Musculoskeletal:         General: Normal range of motion.   Skin:     General: Skin is warm and dry.   Neurological:      General: No focal deficit present.      Mental Status: She is alert and oriented to person, place, and time. Mental status is at baseline.   Psychiatric:         Mood and Affect: Mood normal.    "      Behavior: Behavior normal.       Review of Systems   I have reviewed and reconciled the medication list with the patient today.   Current Outpatient Medications:     acetaminophen (Tylenol) 500 mg tablet, Take 1 tablet (500 mg) by mouth every 8 hours if needed., Disp: , Rfl:     [START ON 3/24/2024] amphetamine-dextroamphetamine (Adderall) 30 mg tablet, Take 1 tablet (30 mg) by mouth once daily at noon. Take before meals.. Do not start before March 24, 2024., Disp: 30 tablet, Rfl: 0    [START ON 4/21/2024] amphetamine-dextroamphetamine (Adderall) 30 mg tablet, Take 1 tablet (30 mg) by mouth once daily at noon. Take before meals.. Do not start before April 21, 2024., Disp: 30 tablet, Rfl: 0    [START ON 5/19/2024] amphetamine-dextroamphetamine (Adderall) 30 mg tablet, Take 1 tablet (30 mg) by mouth once daily at noon. Take before meals.. Do not start before May 19, 2024., Disp: 30 tablet, Rfl: 0    [START ON 3/24/2024] amphetamine-dextroamphetamine XR (Adderall XR) 30 mg 24 hr capsule, Take 1 capsule (30 mg) by mouth once daily in the morning. Do not crush or chew. Do not start before March 24, 2024., Disp: 30 capsule, Rfl: 0    [START ON 4/21/2024] amphetamine-dextroamphetamine XR (Adderall XR) 30 mg 24 hr capsule, Take 1 capsule (30 mg) by mouth once daily in the morning. Do not crush or chew. Do not start before April 21, 2024., Disp: 30 capsule, Rfl: 0    [START ON 5/19/2024] amphetamine-dextroamphetamine XR (Adderall XR) 30 mg 24 hr capsule, Take 1 capsule (30 mg) by mouth once daily in the morning. Do not crush or chew. Do not start before May 19, 2024., Disp: 30 capsule, Rfl: 0    ibuprofen 600 mg tablet, Take 1 tablet (600 mg) by mouth every 8 hours if needed., Disp: , Rfl:     levothyroxine (Synthroid, Levoxyl) 25 mcg tablet, Take 1 tablet (25 mcg) by mouth once daily., Disp: 30 tablet, Rfl: 11    naloxone (Narcan) 4 mg/0.1 mL nasal spray, Administer 1 spray (4 mg) into affected nostril(s) if needed for  opioid reversal. May repeat every 2-3 minutes if needed, alternating nostrils, until medical assistance becomes available., Disp: 2 each, Rfl: 0    Nicoderm CQ 21 mg/24 hr patch, apply 1 patch to CLEAN, DRY, AND INTACT SKIN once daily REMOVE ev...  (REFER TO PRESCRIPTION NOTES)., Disp: , Rfl:     Suboxone 8-2 mg SL film, Place 1 Film under the tongue 3 times a day. Do not start before February 25, 2024., Disp: 90 Film, Rfl: 2    amphetamine-dextroamphetamine XR (Adderall XR) 30 mg 24 hr capsule, Take 1 capsule (30 mg) by mouth once daily in the morning. Do not crush or chew. Do not start before January 17, 2024., Disp: 30 capsule, Rfl: 0     Imaging:  US thyroid    Result Date: 2/24/2024  Interpreted By:  Matheus Alba, STUDY: US THYROID;  2/23/2024 10:50 am   INDICATION: Signs/Symptoms:HYPOTHYROIDISM THYROMEGALY.   COMPARISON: None.   ACCESSION NUMBER(S): [id number]   ORDERING CLINICIAN: DIEGO DE LA O   TECHNIQUE: Multiple ultrasonographic images of the thyroid gland were obtained.   FINDINGS: RIGHT LOBE: The right lobe of the thyroid is markedly heterogeneous and measures 2.2 x 1.8 x 4.7 cm (AP/TV/CC), which is borderline/mildly enlarged. The markedly heterogeneous appearance of the thyroid parenchyma limits the assessment for focal nodules, but within this limitation, no discrete nodule is identified.   LEFT LOBE: The left lobe of the thyroid is markedly heterogeneous in measures 2.1 x 1.7 x 4.5 cm (AP/TV/CC). The markedly heterogeneous appearance of the thyroid parenchyma limits the assessment for focal nodules, but within this limitation, no discrete nodule is identified.   ISTHMUS: The isthmus measures approximately 0.7 cm and is homogeneous in echotexture and without any identifiable nodules.   CERVICAL LYMPH NODES: No cervical lymph adenopathy is present.       1. Enlarged thyroid gland with a markedly heterogeneous echotexture, likely sequela of chronic thyroiditis. 2. Markedly heterogeneous thyroid  parenchyma limits the assessment for focal nodules, but within this limitation, no discrete nodule is identified.   MACRO: None   Signed by: Matheus Alba 2/24/2024 12:17 PM Dictation workstation:   [id number]    Stress Test    Result Date: 2/23/2024             [place] [id number]     Tel [phone] Fax [phone] Exercise Stress Test Patient Name:      MADYSON VIERA    Ordering Provider:     [id number] DIEGO DE LA O Study Date:        2/23/2024           Reading Physician:     [id number] Stu Ma MD MRN/PID:           [id number]            Supervising Physician: [id number] Mari CNP Accession#:        [id number]        Fellow: Date of Birth/Age: 1980 / 43 years Fellow: Gender:            F                   Nurse:                 Misha Santos RN Admit Date:        2/23/2024           Technician:            Meir Toney Admission Status:  Outpatient          Sonographer:           N/A Height:            165.10 cm           Technologist: Weight:            76.20 kg            Additional Staff:      [id number] Mari CNP BSA:               1.84 m2             Encounter#:            [id number] BMI:               27.96 kg/m2         Patient Location:      [facility] Study Type:    STRESS TEST ONLY Diagnosis/ICD: Chest pain, unspecified-R07.9; Dyspnea, unspecified-R06.00 Indication:    Chest Pain and Dyspnea CPT Codes:     Stress Test Interpretation-94751; Stress Test Supervision-[id number] Falls Risk: Low: Patient has low risk for sustaining a fall; environmental safety interventions in place.  Study Details: Correct procedure and  correct patient verified verbally and with                ID Band checked.  Patient History: Dyspnea, chest pain and family history of congestive heart failure. A 43 y.o. female presents for exercise stress test for dyspnea and chest pain. Allergies: CODEINE. Smoker:    Current, 1 packs per day.  Medications: SYNTHROID, TYLENOL, ADDERALL. The patient took medications as prescribed.  Patient Performance: The patient exercised to stage III on a Harvey protocol for 6 minutes and 27 seconds, achieving 7.60 METS. The peak heart rate achieved was 153 bpm, which was 87 % of the age predicted target heart rate of 175 bpm. The resting blood pressure was 129/77 mmHg with a heart rate of 76 bpm. The standing blood pressure was 118/72 mmHg with a heart rate of 82 bpm. The patient's functional capacity was below average. The patient developed dyspnea during the stress exam. The symptoms resolved with rest. The blood pressure response was normal. The test was terminated due to: fatigue, dyspnea, MPHR >85% and patient request. Patient has met the discharge criteria and is discharged to home.  Baseline ECG: Resting ECG showed normal sinus rhythm with nonspecific ST-T wave changes and right atrial enlargement.  Stress ECG: Stress ECG showed sinus tachycardia, with no abnormal findings.  Stress Stage Data: +-----------------+---+------+-------+----+-----------------------+                  HR Sys BPDias BPMETSComments                +-----------------+---+------+-------+----+-----------------------+ Baseline Resting 76 129   77                                 +-----------------+---+------+-------+----+-----------------------+ Baseline [id number] 118   72                                 +-----------------+---+------+-------+----+-----------------------+ Stage 1/2                            NO SYMPTOMS AT BASELINE +-----------------+---+------+-------+----+-----------------------+ Stage I          [id number]    68         mild dyspnea            +-----------------+---+------+-------+----+-----------------------+ Stage II         [id number]   78         dyspnea                 +-----------------+---+------+-------+----+-----------------------+ Stage III        153             7.60dyspnea                 +-----------------+---+------+-------+----+-----------------------+  Recovery ECG: Recovery ECG showed normal sinus rhythm, with pvc's.  +------------+--+------+-------+-----------------+             HRSys BPDias BPComments          +------------+--+------+-------+-----------------+ Recovery I  21535   87     dyspnea resolving +------------+--+------+-------+-----------------+ Recovery II 71327   85     dyspnea resolved  +------------+--+------+-------+-----------------+ Recovery III88             no symptoms       +------------+--+------+-------+-----------------+  Summary:  1. Adequate level of stress achieved.  2. No clinical or electrocardiographic evidence for ischemia at maximal workload. [id number] Stu Ma MD Electronically signed on 2/23/2024 at 2:50:53 PM   ** Final **        Labs reviewed:    Lab Results   Component Value Date    WBC 10.9 01/12/2024    HGB 12.9 01/12/2024    HCT 40.5 01/12/2024     01/12/2024    CHOL 154 01/12/2024    TRIG 69 01/12/2024    HDL 50.2 01/12/2024    ALT 24 01/12/2024    AST 22 01/12/2024     01/12/2024    K 4.5 01/12/2024     01/12/2024    CREATININE 0.78 01/12/2024    BUN 18 01/12/2024    CO2 26 01/12/2024    TSH 14.29 (H) 01/12/2024       Assessment/Plan   Problem List Items Addressed This Visit    None  Visit Diagnoses         Codes    AGUILAR (dyspnea on exertion)    -  Primary R06.09    Chest pain, unspecified type     R07.9    Hypothyroidism, unspecified type     E03.9    Relevant Orders    TSH    T4, free    Thyromegaly     E01.0            Reinforced lifestyle modifications  Continue current medications as  listed  Physical activity as tolerated and healthy diet encouraged  Maintain a healthy weight  Follow up in  6WEEKS

## 2024-03-25 DIAGNOSIS — E03.9 HYPOTHYROIDISM, UNSPECIFIED TYPE: ICD-10-CM

## 2024-03-25 RX ORDER — LEVOTHYROXINE SODIUM 25 UG/1
25 TABLET ORAL DAILY
Qty: 90 TABLET | Refills: 1 | Status: SHIPPED | OUTPATIENT
Start: 2024-03-25 | End: 2024-03-25 | Stop reason: SDUPTHER

## 2024-03-25 RX ORDER — LEVOTHYROXINE SODIUM 25 UG/1
25 TABLET ORAL DAILY
Qty: 90 TABLET | Refills: 0 | Status: SHIPPED | OUTPATIENT
Start: 2024-03-25 | End: 2024-04-23 | Stop reason: SDUPTHER

## 2024-03-25 NOTE — TELEPHONE ENCOUNTER
Patient calling for medication refill.     Last  3/8/2024  Future Appointments       Date / Time Provider Department Dept Phone    4/23/2024 12:00 PM Terry Sampson MD [facility] [phone]    5/29/2024 8:30 AM Brandi Howard, APRN-CNP, APRN-CNS [facility] [phone]

## 2024-03-25 NOTE — TELEPHONE ENCOUNTER
----- Message from Terry Sampson MD sent at 3/25/2024 11:17 AM EDT -----  Regarding: FW: levothyroxine 25 mcg tablet  Contact: [phone]  Okay for refill  ----- Message -----  From: Jia Gutiérrez MA  Sent: 3/22/2024   4:36 PM EDT  To: Terry Sampson MD  Subject: FW: levothyroxine 25 mcg tablet                    ----- Message -----  From: Madhavi Brownlee  Sent: 3/22/2024   4:11 PM EDT  To: [facility] [place] [id number] Clinical Support Staff  Subject: levothyroxine 25 mcg tablet                      Hi, this is Madhavi Brownlee I have a question about my medication I'm taking levothyroxine 25 mcg tablet, I'm not sure how I messed up but I thought I was supposed to be taking 2 tablets by mouth a day and I guess it's 1 tablets by mouth a day. I'm leaving for [place] Tuesday March 26,2024 and will not return until April 20,2024 I'm almost out of my medication I feel so much better since I started taking it. Is there anyway to get a refill on it be4 I leave ? I would have to have the medication at pharmacy by Monday March25,2024 if ur able to do it. Again I apologize for this , I don't understand why I thought it was 2 a day.                        Madhavi Brownlee                        03/22/24                  [phone]

## 2024-04-22 ENCOUNTER — LAB (OUTPATIENT)
Dept: LAB | Facility: LAB | Age: 44
End: 2024-04-22
Payer: COMMERCIAL

## 2024-04-22 DIAGNOSIS — E01.0 THYROMEGALY: ICD-10-CM

## 2024-04-22 DIAGNOSIS — E03.9 HYPOTHYROIDISM, UNSPECIFIED TYPE: ICD-10-CM

## 2024-04-22 LAB
T4 FREE SERPL-MCNC: 0.83 NG/DL (ref 0.61–1.12)
TSH SERPL-ACNC: 6.04 MIU/L (ref 0.44–3.98)

## 2024-04-22 PROCEDURE — 36415 COLL VENOUS BLD VENIPUNCTURE: CPT

## 2024-04-22 PROCEDURE — 84443 ASSAY THYROID STIM HORMONE: CPT

## 2024-04-22 PROCEDURE — 84439 ASSAY OF FREE THYROXINE: CPT

## 2024-04-23 ENCOUNTER — OFFICE VISIT (OUTPATIENT)
Dept: PRIMARY CARE | Facility: CLINIC | Age: 44
End: 2024-04-23
Payer: COMMERCIAL

## 2024-04-23 VITALS
OXYGEN SATURATION: 97 % | BODY MASS INDEX: 29.16 KG/M2 | TEMPERATURE: 98.5 F | WEIGHT: 175 LBS | HEART RATE: 73 BPM | DIASTOLIC BLOOD PRESSURE: 86 MMHG | SYSTOLIC BLOOD PRESSURE: 138 MMHG | HEIGHT: 65 IN

## 2024-04-23 DIAGNOSIS — E01.0 THYROMEGALY: Primary | ICD-10-CM

## 2024-04-23 DIAGNOSIS — M25.559 ARTHRALGIA OF HIP, UNSPECIFIED LATERALITY: ICD-10-CM

## 2024-04-23 DIAGNOSIS — E03.9 HYPOTHYROIDISM, UNSPECIFIED TYPE: ICD-10-CM

## 2024-04-23 DIAGNOSIS — M25.859 FEMORAL ACETABULAR IMPINGEMENT: ICD-10-CM

## 2024-04-23 PROCEDURE — 99213 OFFICE O/P EST LOW 20 MIN: CPT | Performed by: FAMILY MEDICINE

## 2024-04-23 RX ORDER — LEVOTHYROXINE SODIUM 25 UG/1
25 TABLET ORAL DAILY
Qty: 90 TABLET | Refills: 1 | Status: SHIPPED | OUTPATIENT
Start: 2024-04-23 | End: 2024-04-23 | Stop reason: SDUPTHER

## 2024-04-23 RX ORDER — LEVOTHYROXINE SODIUM 25 UG/1
50 TABLET ORAL DAILY
Qty: 180 TABLET | Refills: 1 | Status: SHIPPED | OUTPATIENT
Start: 2024-04-23 | End: 2024-06-05 | Stop reason: SDUPTHER

## 2024-04-23 ASSESSMENT — ENCOUNTER SYMPTOMS
OCCASIONAL FEELINGS OF UNSTEADINESS: 0
DEPRESSION: 0
LOSS OF SENSATION IN FEET: 0

## 2024-04-23 NOTE — PROGRESS NOTES
"Subjective   Chief complaint: Madhavi Brownlee is a 43 y.o. female who presents for Follow-up (Patient here for 6 week follow-up. Patient stated that she was taking Levothyroxine 25 mcg. 2 daily instead on one daily, so is now out of medication. ).    HPI:  HPI  Follow-up thyroid.  She was confused at her last visit.  She actually started taking 2 tablets of this 50 mcg daily of levothyroxine.  However therefore she ran out.  So therefore for about 3 to 4 weeks she has not had any at all.  Reviewed her recent laboratory assessment.  TSH had improved.  Moving forward working to go ahead and continue with the 50 mcg daily dose for now.  Organ to see her back in 6-week.  Repeat TSH T4 just prior to that.  Further adjustments as indicated.  Overall though she has a lot better.  Her symptoms improved.  Energy is improved.  Breathing is improved.  Tolerating.  No side effects.  Objective   /86 (BP Location: Right arm, Patient Position: Sitting, BP Cuff Size: Adult)   Pulse 73   Temp 36.9 °C (98.5 °F) (Temporal)   Ht 1.651 m (5' 5\")   Wt 79.4 kg (175 lb)   SpO2 97%   BMI 29.12 kg/m²   Physical Exam  Vitals and nursing note reviewed.   Constitutional:       Appearance: Normal appearance.   HENT:      Head: Normocephalic and atraumatic.   Eyes:      Extraocular Movements: Extraocular movements intact.      Conjunctiva/sclera: Conjunctivae normal.      Pupils: Pupils are equal, round, and reactive to light.   Cardiovascular:      Rate and Rhythm: Normal rate and regular rhythm.      Heart sounds: Normal heart sounds.   Pulmonary:      Effort: Pulmonary effort is normal.      Breath sounds: Normal breath sounds.   Abdominal:      General: Abdomen is flat. Bowel sounds are normal.      Palpations: Abdomen is soft.   Musculoskeletal:         General: Normal range of motion.   Skin:     General: Skin is warm and dry.   Neurological:      General: No focal deficit present.      Mental Status: She is alert and oriented " to person, place, and time. Mental status is at baseline.   Psychiatric:         Mood and Affect: Mood normal.         Behavior: Behavior normal.       Review of Systems   I have reviewed and reconciled the medication list with the patient today.   Current Outpatient Medications:     acetaminophen (Tylenol) 500 mg tablet, Take 1 tablet (500 mg) by mouth every 8 hours if needed., Disp: , Rfl:     amphetamine-dextroamphetamine (Adderall) 30 mg tablet, Take 1 tablet (30 mg) by mouth once daily at noon. Take before meals.. Do not start before March 24, 2024., Disp: 30 tablet, Rfl: 0    [START ON 5/19/2024] amphetamine-dextroamphetamine (Adderall) 30 mg tablet, Take 1 tablet (30 mg) by mouth once daily at noon. Take before meals.. Do not start before May 19, 2024., Disp: 30 tablet, Rfl: 0    amphetamine-dextroamphetamine XR (Adderall XR) 30 mg 24 hr capsule, Take 1 capsule (30 mg) by mouth once daily in the morning. Do not crush or chew. Do not start before March 24, 2024., Disp: 30 capsule, Rfl: 0    ibuprofen 600 mg tablet, Take 1 tablet (600 mg) by mouth every 8 hours if needed., Disp: , Rfl:     Suboxone 8-2 mg SL film, Place 1 Film under the tongue 3 times a day. Do not start before February 25, 2024., Disp: 90 Film, Rfl: 2    amphetamine-dextroamphetamine (Adderall) 30 mg tablet, Take 1 tablet (30 mg) by mouth once daily at noon. Take before meals.. Do not start before April 21, 2024., Disp: 30 tablet, Rfl: 0    amphetamine-dextroamphetamine XR (Adderall XR) 30 mg 24 hr capsule, Take 1 capsule (30 mg) by mouth once daily in the morning. Do not crush or chew. Do not start before January 17, 2024., Disp: 30 capsule, Rfl: 0    amphetamine-dextroamphetamine XR (Adderall XR) 30 mg 24 hr capsule, Take 1 capsule (30 mg) by mouth once daily in the morning. Do not crush or chew. Do not start before April 21, 2024., Disp: 30 capsule, Rfl: 0    [START ON 5/19/2024] amphetamine-dextroamphetamine XR (Adderall XR) 30 mg 24 hr  capsule, Take 1 capsule (30 mg) by mouth once daily in the morning. Do not crush or chew. Do not start before May 19, 2024., Disp: 30 capsule, Rfl: 0    levothyroxine (Synthroid, Levoxyl) 25 mcg tablet, Take 2 tablets (50 mcg) by mouth once daily., Disp: 180 tablet, Rfl: 1    Nicoderm CQ 21 mg/24 hr patch, apply 1 patch to CLEAN, DRY, AND INTACT SKIN once daily REMOVE ev...  (REFER TO PRESCRIPTION NOTES)., Disp: , Rfl:      Imaging:  No results found.     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

## 2024-05-20 DIAGNOSIS — F11.20 OPIOID USE DISORDER, SEVERE, DEPENDENCE (MULTI): ICD-10-CM

## 2024-05-20 RX ORDER — BUPRENORPHINE HYDROCHLORIDE, NALOXONE HYDROCHLORIDE 8; 2 MG/1; MG/1
1 FILM, SOLUBLE BUCCAL; SUBLINGUAL 3 TIMES DAILY
Qty: 90 FILM | Refills: 2 | Status: SHIPPED | OUTPATIENT
Start: 2024-05-20 | End: 2024-08-18

## 2024-05-29 ENCOUNTER — OFFICE VISIT (OUTPATIENT)
Dept: BEHAVIORAL HEALTH | Facility: CLINIC | Age: 44
End: 2024-05-29
Payer: COMMERCIAL

## 2024-05-29 VITALS
WEIGHT: 175 LBS | HEIGHT: 65 IN | DIASTOLIC BLOOD PRESSURE: 83 MMHG | BODY MASS INDEX: 29.16 KG/M2 | SYSTOLIC BLOOD PRESSURE: 126 MMHG | HEART RATE: 75 BPM

## 2024-05-29 DIAGNOSIS — Z00.00 HEALTHCARE MAINTENANCE: ICD-10-CM

## 2024-05-29 DIAGNOSIS — Z00.00 HEALTH CARE MAINTENANCE: ICD-10-CM

## 2024-05-29 DIAGNOSIS — Z79.899 MEDICATION MANAGEMENT: ICD-10-CM

## 2024-05-29 PROCEDURE — 80348 DRUG SCREENING BUPRENORPHINE: CPT | Performed by: CLINICAL NURSE SPECIALIST

## 2024-05-29 PROCEDURE — 80346 BENZODIAZEPINES1-12: CPT | Performed by: CLINICAL NURSE SPECIALIST

## 2024-05-29 PROCEDURE — 82570 ASSAY OF URINE CREATININE: CPT | Mod: 59 | Performed by: CLINICAL NURSE SPECIALIST

## 2024-05-29 PROCEDURE — 80324 DRUG SCREEN AMPHETAMINES 1/2: CPT | Performed by: CLINICAL NURSE SPECIALIST

## 2024-05-29 NOTE — PROGRESS NOTES
Outpatient Psychiatry      Subjective   Madhavi Brownlee, is a 43 y.o. female,  seen in follow-up.      Assessment/Plan   Problem List Items Addressed This Visit               ICD-10-CM     ADHD (attention deficit hyperactivity disorder), combined type F90.2       Tolerating and benefiting from current regimen. No indication for medication changes today.      Continue Adderall XR 30 mg PO daily and Adderall IR 30 mg PO in the afternoon.    UDS collected in office today.      Reviewed OARRS, no discrepancies or concerns. Discussed risk of insomnia, anxiety, agitation, anorexia, autonomic effects, toxicity, psychosis, dependence, tolerance, abuse.                                               Moderate tobacco use disorder F17.200       Educated re: the health risks associated with the use of nicotine/tobacco products, and counseled on the importance of cessation. Discussed options for nicotine replacement and/or pharmacotherapies to aid in cessation (e.g. varenicline, bupropion). Not currently interested in cessation assistance.             Opioid use disorder, severe, dependence (CMS/[facility]) F11.20       Tolerating and benefiting from current regimen. No indication for medication changes today.      Continue Suboxone 8-2 mg SL TID    Discussed that long term, plan will be to decrease Suboxone dose to within recommended dosing range for tx of OUD (16 mg total daily or less). She has been at current dose for several years, previously managed by an addictions psychiatrist. Will defer changes to dosing today given mutliple psychosocial stressors contributing to risk.    UDS collected in office today.     Reviewed OARRS, no discrepancies or concerns. Discussed risk of motor/cognitive impairment, sedation, dependence, tolerance, abuse, withdrawal sequelae, accidental overdose, life-threatening respiratory depression (nolan. in combination with alcohol, benzodiazepines and/or other opioids), excess sedation in combination with  other sedating medicines.              Follow-up in 3 months.     Risk Assessment:  Risk Assessment: Madhavi Brownlee is currently a low acute risk of suicide and self-harm due to no past suicide attempt(s) and not currently endorsing thoughts of suicide. Madhavi Brownlee is currently a low acute risk of violence and harm to others due to no past history of violence and not currently threatening others.  Suicidal Risk Factors:  and multiple family stressors.   Violence Risk Factors: none  Protective Factors: strong coping skills, sense of responsibility towards family, social support/connectedness, moral objections to suicide, child related concerns/living with child <18 years, positive family relationships, hopefulness/future orientation, and marriage/partnership.      Reason for Visit:  Follow-up for medication management.     HPI:  Since her last visit,     She and mom had to take out a restraining order against her daughter.   Daughter showed up at mom's home agitated and intoxicated. Had to call police.   Daughter lost full custody of children.   Has court today re: restraining order. Mom is having a hard time setting limits with daughter.   Dad's health has continued to decline. Frustrated that he is having to deal with daughter's relapse and behavior.     Grandkids have been having a very difficult time in school since their father .     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     Started levothyroxine ~1 month ago.       Current Psychiatric Medications:  Suboxone 8-2 mg SL TID  Adderall XR 30 mg PO daily  Adderall IR 30 mg PO daily in the afternoon      Record Review: brief     Medical Review Of Systems:  Pertinent items are noted in HPI.    Psychiatric Review Of Systems:  As noted in HPI.        Objective   Mental Status Exam  General Appearance: Well groomed,  "appropriate eye contact  Attitude/Behavior: Cooperative  Motor: No psychomotor agitation or retardation, no tremor or other abnormal movements  Speech: Normal rate, volume, prosody  Gait/Station: WFL - Within functional limits  Mood: \"I just have too much going on.\"  Affect: Constricted, Anxious, Congruent with mood and topic of conversation  Thought Process: Linear, goal directed  Thought Associations: No loosening of associations  Thought Content: Other: (comment) (anxious themes r/t multiple psychosocial stressors.)  Perception: No perceptual abnormalities noted  Sensorium: Alert and oriented to person, place, time and situation  Insight: Intact  Judgement: Intact  Cognition: Cognitively intact to conversational testing with respect to attention, orientation, fund of knowledge, recent and remote memory, and language    Vitals:  Vitals:    05/29/24 0838   BP: 126/83   Pulse: 75       Labs:  Lab on 04/22/2024   Component Date Value    Thyroid Stimulating Horm* 04/22/2024 6.04 (H)     Thyroxine, Free 04/22/2024 0.83            Brandi Howard, APRN-CNP, APRN-CNS  "

## 2024-05-30 LAB
AMPHETAMINES UR QL SCN: ABNORMAL
BARBITURATES UR QL SCN: ABNORMAL
BZE UR QL SCN: ABNORMAL
CANNABINOIDS UR QL SCN: ABNORMAL
CREAT UR-MCNC: 161.8 MG/DL (ref 20–320)
PCP UR QL SCN: ABNORMAL

## 2024-06-03 ENCOUNTER — LAB (OUTPATIENT)
Dept: LAB | Facility: LAB | Age: 44
End: 2024-06-03
Payer: COMMERCIAL

## 2024-06-03 DIAGNOSIS — E03.9 HYPOTHYROIDISM, UNSPECIFIED TYPE: ICD-10-CM

## 2024-06-03 LAB
BUPRENORPHINE UR-MCNC: 4 NG/ML
BUPRENORPHINE UR-MCNC: 520 NG/ML
NALOXONE UR CFM-MCNC: <100 NG/ML
NORBUPRENORPHINE UR CFM-MCNC: 858 NG/ML
NORBUPRENORPHINE UR-MCNC: 386 NG/ML
T4 FREE SERPL-MCNC: 0.85 NG/DL (ref 0.61–1.12)
TSH SERPL-ACNC: 10.87 MIU/L (ref 0.44–3.98)

## 2024-06-03 PROCEDURE — 84443 ASSAY THYROID STIM HORMONE: CPT

## 2024-06-03 PROCEDURE — 36415 COLL VENOUS BLD VENIPUNCTURE: CPT

## 2024-06-03 PROCEDURE — 84439 ASSAY OF FREE THYROXINE: CPT

## 2024-06-05 ENCOUNTER — OFFICE VISIT (OUTPATIENT)
Dept: PRIMARY CARE | Facility: CLINIC | Age: 44
End: 2024-06-05
Payer: COMMERCIAL

## 2024-06-05 VITALS
SYSTOLIC BLOOD PRESSURE: 136 MMHG | TEMPERATURE: 98.2 F | BODY MASS INDEX: 29.22 KG/M2 | OXYGEN SATURATION: 98 % | DIASTOLIC BLOOD PRESSURE: 74 MMHG | HEART RATE: 80 BPM | HEIGHT: 65 IN | WEIGHT: 175.4 LBS

## 2024-06-05 DIAGNOSIS — M25.559 ARTHRALGIA OF HIP, UNSPECIFIED LATERALITY: ICD-10-CM

## 2024-06-05 DIAGNOSIS — E03.9 HYPOTHYROIDISM, UNSPECIFIED TYPE: Primary | ICD-10-CM

## 2024-06-05 DIAGNOSIS — M25.859 FEMORAL ACETABULAR IMPINGEMENT: ICD-10-CM

## 2024-06-05 PROCEDURE — 99213 OFFICE O/P EST LOW 20 MIN: CPT | Performed by: FAMILY MEDICINE

## 2024-06-05 RX ORDER — LEVOTHYROXINE SODIUM 75 UG/1
75 TABLET ORAL DAILY
Qty: 90 TABLET | Refills: 1 | Status: SHIPPED | OUTPATIENT
Start: 2024-06-05 | End: 2025-06-05

## 2024-06-05 ASSESSMENT — PATIENT HEALTH QUESTIONNAIRE - PHQ9
2. FEELING DOWN, DEPRESSED OR HOPELESS: NOT AT ALL
1. LITTLE INTEREST OR PLEASURE IN DOING THINGS: NOT AT ALL
SUM OF ALL RESPONSES TO PHQ9 QUESTIONS 1 AND 2: 0

## 2024-06-05 ASSESSMENT — ENCOUNTER SYMPTOMS
DEPRESSION: 0
LOSS OF SENSATION IN FEET: 0
OCCASIONAL FEELINGS OF UNSTEADINESS: 0

## 2024-06-05 NOTE — PROGRESS NOTES
"Subjective   Chief complaint: Madhavi Brownlee is a 43 y.o. female who presents for Follow-up.    HPI:  HPI  Chronic disease management.  Follow-up thyroid.  At this point, she has been taking 50 mcg daily.  She has not missed any doses.  Takes in the morning.  First thing.  Empty stomach.  Reviewed her labs.  TSH got worse.  I will increase her dose to 75 mcg.  Other active problems noted.  Occasional chest pain.  Breathing is been relatively stable.  Hip pain is stable.  We can increase the dose to 75.  Repeat thyroid labs in 6 weeks.  Follow-up in 8 weeks  Objective   /74 (BP Location: Left arm, Patient Position: Sitting)   Pulse 80   Temp 36.8 °C (98.2 °F)   Ht 1.651 m (5' 5\")   Wt 79.6 kg (175 lb 6.4 oz)   SpO2 98% Comment: RA  BMI 29.19 kg/m²   Physical Exam  Vitals and nursing note reviewed.   Constitutional:       Appearance: Normal appearance.   HENT:      Head: Normocephalic and atraumatic.   Eyes:      Extraocular Movements: Extraocular movements intact.      Conjunctiva/sclera: Conjunctivae normal.      Pupils: Pupils are equal, round, and reactive to light.   Cardiovascular:      Rate and Rhythm: Normal rate and regular rhythm.      Heart sounds: Normal heart sounds.   Pulmonary:      Effort: Pulmonary effort is normal.      Breath sounds: Normal breath sounds.   Abdominal:      General: Abdomen is flat. Bowel sounds are normal.      Palpations: Abdomen is soft.   Musculoskeletal:         General: Normal range of motion.   Skin:     General: Skin is warm and dry.   Neurological:      General: No focal deficit present.      Mental Status: She is alert and oriented to person, place, and time. Mental status is at baseline.   Psychiatric:         Mood and Affect: Mood normal.         Behavior: Behavior normal.       Review of Systems   I have reviewed and reconciled the medication list with the patient today.   Current Outpatient Medications:     amphetamine-dextroamphetamine (Adderall) 30 mg " tablet, Take 1 tablet (30 mg) by mouth once daily at noon. Take before meals.. Do not start before May 19, 2024., Disp: 30 tablet, Rfl: 0    amphetamine-dextroamphetamine XR (Adderall XR) 30 mg 24 hr capsule, Take 1 capsule (30 mg) by mouth once daily in the morning. Do not crush or chew. Do not start before May 19, 2024., Disp: 30 capsule, Rfl: 0    ibuprofen 600 mg tablet, Take 1 tablet (600 mg) by mouth every 8 hours if needed., Disp: , Rfl:     Nicoderm CQ 21 mg/24 hr patch, apply 1 patch to CLEAN, DRY, AND INTACT SKIN once daily REMOVE ev...  (REFER TO PRESCRIPTION NOTES)., Disp: , Rfl:     Suboxone 8-2 mg SL film, Place 1 Film under the tongue 3 times a day., Disp: 90 Film, Rfl: 2    levothyroxine (Synthroid, Levoxyl) 75 mcg tablet, Take 1 tablet (75 mcg) by mouth once daily., Disp: 90 tablet, Rfl: 1     Imaging:  No results found.     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

## 2024-06-21 ENCOUNTER — TELEPHONE (OUTPATIENT)
Dept: BEHAVIORAL HEALTH | Facility: CLINIC | Age: 44
End: 2024-06-21
Payer: COMMERCIAL

## 2024-06-21 DIAGNOSIS — F90.2 ADHD (ATTENTION DEFICIT HYPERACTIVITY DISORDER), COMBINED TYPE: ICD-10-CM

## 2024-06-21 DIAGNOSIS — F11.20 OPIOID USE DISORDER, SEVERE, DEPENDENCE (MULTI): ICD-10-CM

## 2024-06-21 RX ORDER — NALOXONE HYDROCHLORIDE 4 MG/.1ML
SPRAY NASAL
COMMUNITY
Start: 2022-08-24

## 2024-06-21 RX ORDER — BUPRENORPHINE HYDROCHLORIDE, NALOXONE HYDROCHLORIDE 8; 2 MG/1; MG/1
1 FILM, SOLUBLE BUCCAL; SUBLINGUAL 3 TIMES DAILY
Qty: 90 FILM | Refills: 0 | Status: SHIPPED | OUTPATIENT
Start: 2024-06-21

## 2024-06-21 RX ORDER — DEXTROAMPHETAMINE SACCHARATE, AMPHETAMINE ASPARTATE MONOHYDRATE, DEXTROAMPHETAMINE SULFATE AND AMPHETAMINE SULFATE 7.5; 7.5; 7.5; 7.5 MG/1; MG/1; MG/1; MG/1
30 CAPSULE, EXTENDED RELEASE ORAL EVERY MORNING
Qty: 30 CAPSULE | Refills: 0 | Status: SHIPPED | OUTPATIENT
Start: 2024-06-21 | End: 2024-07-21

## 2024-06-21 RX ORDER — DEXTROAMPHETAMINE SACCHARATE, AMPHETAMINE ASPARTATE, DEXTROAMPHETAMINE SULFATE AND AMPHETAMINE SULFATE 7.5; 7.5; 7.5; 7.5 MG/1; MG/1; MG/1; MG/1
30 TABLET ORAL
Qty: 30 TABLET | Refills: 0 | Status: SHIPPED | OUTPATIENT
Start: 2024-06-21 | End: 2024-07-21

## 2024-06-21 NOTE — TELEPHONE ENCOUNTER
Reviewed OARRS/PDMP.   Sent in refill on Adderall (ER and short-acting) in coverage for primary prescriber.  Also needs refill on Suboxone.

## 2024-06-30 ENCOUNTER — HOSPITAL ENCOUNTER (EMERGENCY)
Age: 44
Discharge: HOME | End: 2024-06-30
Attending: EMERGENCY MEDICINE
Payer: COMMERCIAL

## 2024-06-30 VITALS
BODY MASS INDEX: 29.66 KG/M2 | TEMPERATURE: 98.2 F | HEIGHT: 65 IN | WEIGHT: 178 LBS | SYSTOLIC BLOOD PRESSURE: 149 MMHG | RESPIRATION RATE: 20 BRPM | DIASTOLIC BLOOD PRESSURE: 93 MMHG | HEART RATE: 78 BPM | OXYGEN SATURATION: 97 %

## 2024-06-30 DIAGNOSIS — I88.9 LYMPHADENITIS: Primary | ICD-10-CM

## 2024-06-30 PROCEDURE — 99283 EMERGENCY DEPT VISIT LOW MDM: CPT

## 2024-06-30 PROCEDURE — 6370000000 HC RX 637 (ALT 250 FOR IP): Performed by: EMERGENCY MEDICINE

## 2024-06-30 RX ORDER — AMOXICILLIN AND CLAVULANATE POTASSIUM 875; 125 MG/1; MG/1
1 TABLET, FILM COATED ORAL 2 TIMES DAILY
Qty: 14 TABLET | Refills: 0 | Status: SHIPPED | OUTPATIENT
Start: 2024-06-30 | End: 2024-07-07

## 2024-06-30 RX ORDER — AMOXICILLIN AND CLAVULANATE POTASSIUM 875; 125 MG/1; MG/1
1 TABLET, FILM COATED ORAL ONCE
Status: COMPLETED | OUTPATIENT
Start: 2024-06-30 | End: 2024-06-30

## 2024-06-30 RX ORDER — ACETAMINOPHEN 325 MG/1
650 TABLET ORAL ONCE
Status: COMPLETED | OUTPATIENT
Start: 2024-06-30 | End: 2024-06-30

## 2024-06-30 RX ADMIN — AMOXICILLIN AND CLAVULANATE POTASSIUM 1 TABLET: 875; 125 TABLET, FILM COATED ORAL at 10:19

## 2024-06-30 RX ADMIN — ACETAMINOPHEN 650 MG: 325 TABLET ORAL at 10:19

## 2024-06-30 ASSESSMENT — PAIN SCALES - GENERAL: PAINLEVEL_OUTOF10: 8

## 2024-06-30 ASSESSMENT — PAIN DESCRIPTION - DESCRIPTORS: DESCRIPTORS: ACHING

## 2024-06-30 ASSESSMENT — LIFESTYLE VARIABLES
HOW OFTEN DO YOU HAVE A DRINK CONTAINING ALCOHOL: NEVER
HOW MANY STANDARD DRINKS CONTAINING ALCOHOL DO YOU HAVE ON A TYPICAL DAY: PATIENT DOES NOT DRINK

## 2024-06-30 ASSESSMENT — PAIN DESCRIPTION - ORIENTATION: ORIENTATION: RIGHT

## 2024-06-30 ASSESSMENT — PAIN DESCRIPTION - FREQUENCY: FREQUENCY: CONTINUOUS

## 2024-06-30 ASSESSMENT — PAIN DESCRIPTION - LOCATION: LOCATION: ARM

## 2024-06-30 ASSESSMENT — PAIN DESCRIPTION - PAIN TYPE: TYPE: ACUTE PAIN

## 2024-06-30 ASSESSMENT — PAIN - FUNCTIONAL ASSESSMENT: PAIN_FUNCTIONAL_ASSESSMENT: 0-10

## 2024-06-30 NOTE — ED PROVIDER NOTES
[facility] ED  EMERGENCY DEPARTMENT ENCOUNTER      Pt Name: Debora Soto  MRN: [id number]  Birthdate 1980  Date of evaluation: 6/30/2024  Provider: Carolina Lewis DO  10:22 AM    CHIEF COMPLAINT       Chief Complaint   Patient presents with    Abscess     Left armpit - started yesterday     Chief complaint: Painful armpit lump  History of chief complaint: This 44-year-old female presents the emergency department complaining of noticing a tender lump in the left armpit yesterday.  Patient states she did feel a little bit feverish yesterday but no measured temperature.  Patient denies any cuts, or infections to the arm.  No chills nausea or vomiting no numb tingling or weakness to the extremity.  No injury or trauma.  Patient states she is otherwise been well no chest pain palpitation or shortness of breath, no weak or dizzy feelings.  Patient is not a diabetic.  Patient denies other complaints.    Nursing Notes were reviewed.    REVIEW OF SYSTEMS       Review of Systems  Pertinent findings documented in the history of present illness  Except as noted above the remainder of the review of systems was reviewed and negative.       PAST MEDICAL HISTORY   History reviewed. No pertinent past medical history.      SURGICAL HISTORY     History reviewed. No pertinent surgical history.      CURRENT MEDICATIONS       Previous Medications    AMPHETAMINE-DEXTROAMPHETAMINE (ADDERALL) 30 MG TABLET    Take 1 tablet by mouth daily.       ALLERGIES     Buprenorphine-naloxone and Codeine    FAMILY HISTORY     History reviewed. No pertinent family history.       SOCIAL HISTORY       Social History     Socioeconomic History    Marital status:      Spouse name: None    Number of children: None    Years of education: None    Highest education level: None   Tobacco Use    Smoking status: Every Day     Current packs/day: 1.00     Types: Cigarettes     Passive exposure: Current    Smokeless tobacco: Never   Vaping Use

## 2024-06-30 NOTE — ED TRIAGE NOTES
Patient presents to ED with reports of left axillary region cyst that started yesterday - reports very painful

## 2024-07-18 DIAGNOSIS — F11.20 OPIOID USE DISORDER, SEVERE, DEPENDENCE (MULTI): ICD-10-CM

## 2024-07-18 DIAGNOSIS — F90.2 ADHD (ATTENTION DEFICIT HYPERACTIVITY DISORDER), COMBINED TYPE: ICD-10-CM

## 2024-07-18 RX ORDER — DEXTROAMPHETAMINE SACCHARATE, AMPHETAMINE ASPARTATE, DEXTROAMPHETAMINE SULFATE AND AMPHETAMINE SULFATE 7.5; 7.5; 7.5; 7.5 MG/1; MG/1; MG/1; MG/1
30 TABLET ORAL
Qty: 30 TABLET | Refills: 0 | Status: SHIPPED | OUTPATIENT
Start: 2024-08-16 | End: 2024-09-15

## 2024-07-18 RX ORDER — DEXTROAMPHETAMINE SACCHARATE, AMPHETAMINE ASPARTATE MONOHYDRATE, DEXTROAMPHETAMINE SULFATE AND AMPHETAMINE SULFATE 7.5; 7.5; 7.5; 7.5 MG/1; MG/1; MG/1; MG/1
30 CAPSULE, EXTENDED RELEASE ORAL EVERY MORNING
Qty: 30 CAPSULE | Refills: 0 | Status: SHIPPED | OUTPATIENT
Start: 2024-07-19 | End: 2024-08-18

## 2024-07-18 RX ORDER — BUPRENORPHINE HYDROCHLORIDE, NALOXONE HYDROCHLORIDE 8; 2 MG/1; MG/1
1 FILM, SOLUBLE BUCCAL; SUBLINGUAL 3 TIMES DAILY
Qty: 90 FILM | Refills: 1 | Status: SHIPPED | OUTPATIENT
Start: 2024-07-19

## 2024-07-18 RX ORDER — DEXTROAMPHETAMINE SACCHARATE, AMPHETAMINE ASPARTATE MONOHYDRATE, DEXTROAMPHETAMINE SULFATE AND AMPHETAMINE SULFATE 7.5; 7.5; 7.5; 7.5 MG/1; MG/1; MG/1; MG/1
30 CAPSULE, EXTENDED RELEASE ORAL EVERY MORNING
Qty: 30 CAPSULE | Refills: 0 | Status: SHIPPED | OUTPATIENT
Start: 2024-08-16 | End: 2024-09-15

## 2024-07-18 RX ORDER — DEXTROAMPHETAMINE SACCHARATE, AMPHETAMINE ASPARTATE, DEXTROAMPHETAMINE SULFATE AND AMPHETAMINE SULFATE 7.5; 7.5; 7.5; 7.5 MG/1; MG/1; MG/1; MG/1
30 TABLET ORAL
Qty: 30 TABLET | Refills: 0 | Status: SHIPPED | OUTPATIENT
Start: 2024-07-19 | End: 2024-08-18

## 2024-07-25 ENCOUNTER — APPOINTMENT (OUTPATIENT)
Dept: OBSTETRICS AND GYNECOLOGY | Facility: CLINIC | Age: 44
End: 2024-07-25
Payer: COMMERCIAL

## 2024-07-25 VITALS
SYSTOLIC BLOOD PRESSURE: 128 MMHG | WEIGHT: 176 LBS | BODY MASS INDEX: 29.32 KG/M2 | HEIGHT: 65 IN | DIASTOLIC BLOOD PRESSURE: 70 MMHG

## 2024-07-25 DIAGNOSIS — Z12.11 COLON CANCER SCREENING: Primary | ICD-10-CM

## 2024-07-25 DIAGNOSIS — N63.21 MASS OF UPPER OUTER QUADRANT OF LEFT BREAST: ICD-10-CM

## 2024-07-25 DIAGNOSIS — Z12.4 CERVICAL CANCER SCREENING: ICD-10-CM

## 2024-07-25 DIAGNOSIS — Z00.00 HEALTHCARE MAINTENANCE: ICD-10-CM

## 2024-07-25 DIAGNOSIS — Z71.6 ENCOUNTER FOR TOBACCO USE CESSATION COUNSELING: ICD-10-CM

## 2024-07-25 PROCEDURE — 87591 N.GONORRHOEAE DNA AMP PROB: CPT

## 2024-07-25 PROCEDURE — 87624 HPV HI-RISK TYP POOLED RSLT: CPT

## 2024-07-25 PROCEDURE — 99213 OFFICE O/P EST LOW 20 MIN: CPT | Performed by: STUDENT IN AN ORGANIZED HEALTH CARE EDUCATION/TRAINING PROGRAM

## 2024-07-25 PROCEDURE — 87491 CHLMYD TRACH DNA AMP PROBE: CPT

## 2024-07-25 PROCEDURE — 87661 TRICHOMONAS VAGINALIS AMPLIF: CPT

## 2024-07-25 PROCEDURE — 99406 BEHAV CHNG SMOKING 3-10 MIN: CPT | Performed by: STUDENT IN AN ORGANIZED HEALTH CARE EDUCATION/TRAINING PROGRAM

## 2024-07-25 PROCEDURE — 99396 PREV VISIT EST AGE 40-64: CPT | Performed by: STUDENT IN AN ORGANIZED HEALTH CARE EDUCATION/TRAINING PROGRAM

## 2024-07-25 RX ORDER — DIPHENHYDRAMINE HCL 25 MG
4 CAPSULE ORAL AS NEEDED
Qty: 100 EACH | Refills: 1 | Status: SHIPPED | OUTPATIENT
Start: 2024-07-25 | End: 2024-08-24

## 2024-07-25 ASSESSMENT — PAIN SCALES - GENERAL: PAINLEVEL: 0-NO PAIN

## 2024-07-25 NOTE — ASSESSMENT & PLAN NOTE
Currently using patch  Previously tried wellbutrin, chantix  Discussed and accepted Nicorette gum today  Total counseling time, 7 mins

## 2024-07-25 NOTE — PROGRESS NOTES
"Madhavi Brownlee is a 44 y.o.  female who is here for a routine exam.   PCP = Terry Sampson MD    Subjective     Concerns today:     Lump in axilla - seen in ED, referred to GYN. Initial bump resolved, new one formed. Left side, started a month ago, painful. A little red. Treated with antibiotics with no resolution.    Gyn History:  Menarche: age 9  Periods are regular every 28-30 days, lasting 7 days.  Dysmenorrhea: severe, occurring first 1-2 days of flow.   Cyclic symptoms include none.  Sexual activity: yes with male partner  Current contraception: s/p BTL  Fertility Goals: does not desire   STI History: none  Pap History: abnormal years ago, most recently 2018 believes normal    Preventative:  Family Hx Breast/Ovarian Ca: Breast - Maternal aunt 50's; Grandmother and 2 aunts - some abdominal cancer requiring large surgery. 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    EtOH: none - 17 years sober   Other Substances: none - 13 years clean  Safe at Home?: Yes  Depression Screen/Mood concerns: No         Objective   /70   Ht 1.651 m (5' 5\")   Wt 79.8 kg (176 lb)   LMP 2024   BMI 29.29 kg/m²   Physical Exam  Vitals reviewed. Exam conducted with a chaperone present.   Constitutional:       Appearance: Normal appearance.   HENT:      Head: Normocephalic.   Cardiovascular:      Rate and Rhythm: Normal rate and regular rhythm.   Pulmonary:      Effort: Pulmonary effort is normal. No respiratory distress.   Chest:   Breasts:     Right: Normal. No swelling, mass or skin change.      Left: No swelling. "          Comments: BB sized exquisitely tender subdermal nodule with well defined borders and mobile in left upper outer quadrant/axilla, overlying erythema without drainage as diagrammed  Abdominal:      General: There is no distension.      Palpations: Abdomen is soft. There is no mass.      Tenderness: There is no abdominal tenderness. There is no guarding or rebound.   Lymphadenopathy:      Upper Body:      Right upper body: No supraclavicular, axillary or pectoral adenopathy.      Left upper body: No supraclavicular, axillary or pectoral adenopathy.   Skin:     General: Skin is warm and dry.   Neurological:      General: No focal deficit present.      Mental Status: She is alert.   Psychiatric:         Mood and Affect: Mood normal.         Behavior: Behavior normal.         Thought Content: Thought content normal.         Judgment: Judgment normal.             Assessment/Plan      Madhavi Brownlee is a 44 y.o.  female presenting today for annual exam with the following problems addressed:    Problem List Items Addressed This Visit       Mass of upper outer quadrant of left breast     Diagnostic mammo with US PRN  Screening mammo ordered also         Relevant Orders    BI mammo left diagnostic tomosynthesis    BI mammo bilateral screening    BI US breast complete left    Encounter for tobacco use cessation counseling     Currently using patch  Previously tried wellbutrin, chantix  Discussed and accepted Nicorette gum today  Total counseling time, 7 mins         Relevant Medications    nicotine polacrilex (Nicorette) 4 mg gum    Healthcare maintenance     - Reviewed healthy lifestyle including well rounded diet and exercise (moderate intensity 150min/week; high intensity 75min/week)  - Immunizations: UTD  - Pap collected, will follow up results with patient via myChart or mail  - Mammogram ordered          Other Visit Diagnoses       Colon cancer screening    -  Primary    Relevant Orders    Colonoscopy  Screening; Average Risk Patient    Cervical cancer screening        Relevant Orders    THINPREP PAP TEST

## 2024-07-25 NOTE — PROGRESS NOTES
New patient visit to [facility]. Pt states she has a lump under her left arm     Last pap: Per pt 2018 ( NEG ) TODAY ( w/ GCCT )  Last mammo: NEVER   Last Colonoscopy: DUE NOW     Chaperone accepted: Julia Storey, CMA3

## 2024-07-25 NOTE — ASSESSMENT & PLAN NOTE
- Reviewed healthy lifestyle including well rounded diet and exercise (moderate intensity 150min/week; high intensity 75min/week)  - Immunizations: UTD  - Pap collected, will follow up results with patient via myChart or mail  - Mammogram ordered

## 2024-07-29 LAB
C TRACH RRNA SPEC QL NAA+PROBE: NEGATIVE
N GONORRHOEA DNA SPEC QL PROBE+SIG AMP: NEGATIVE
T VAGINALIS RRNA SPEC QL NAA+PROBE: NEGATIVE

## 2024-08-05 LAB
CYTOLOGY CMNT CVX/VAG CYTO-IMP: NORMAL
HPV HR 12 DNA GENITAL QL NAA+PROBE: NEGATIVE
HPV HR GENOTYPES PNL CVX NAA+PROBE: NEGATIVE
HPV16 DNA SPEC QL NAA+PROBE: NEGATIVE
HPV18 DNA SPEC QL NAA+PROBE: NEGATIVE
LAB AP HPV GENOTYPE QUESTION: YES
LAB AP HPV HR: NORMAL
LAB AP PAP ADDITIONAL TESTS: NORMAL
LABORATORY COMMENT REPORT: NORMAL
LMP START DATE: NORMAL
PATH REPORT.TOTAL CANCER: NORMAL

## 2024-08-06 ENCOUNTER — LAB (OUTPATIENT)
Dept: LAB | Facility: LAB | Age: 44
End: 2024-08-06
Payer: COMMERCIAL

## 2024-08-06 DIAGNOSIS — E03.9 HYPOTHYROIDISM, UNSPECIFIED TYPE: ICD-10-CM

## 2024-08-06 LAB
T4 FREE SERPL-MCNC: 1.03 NG/DL (ref 0.61–1.12)
TSH SERPL-ACNC: 1.15 MIU/L (ref 0.44–3.98)

## 2024-08-06 PROCEDURE — 84443 ASSAY THYROID STIM HORMONE: CPT

## 2024-08-06 PROCEDURE — 36415 COLL VENOUS BLD VENIPUNCTURE: CPT

## 2024-08-06 PROCEDURE — 84439 ASSAY OF FREE THYROXINE: CPT

## 2024-08-07 ENCOUNTER — APPOINTMENT (OUTPATIENT)
Dept: RADIOLOGY | Facility: HOSPITAL | Age: 44
End: 2024-08-07
Payer: COMMERCIAL

## 2024-08-08 ENCOUNTER — HOSPITAL ENCOUNTER (OUTPATIENT)
Dept: RADIOLOGY | Facility: HOSPITAL | Age: 44
Discharge: HOME | End: 2024-08-08
Payer: COMMERCIAL

## 2024-08-08 ENCOUNTER — APPOINTMENT (OUTPATIENT)
Dept: PRIMARY CARE | Facility: CLINIC | Age: 44
End: 2024-08-08
Payer: COMMERCIAL

## 2024-08-08 VITALS — BODY MASS INDEX: 29.99 KG/M2 | HEIGHT: 65 IN | WEIGHT: 180 LBS

## 2024-08-08 DIAGNOSIS — N63.21 MASS OF UPPER OUTER QUADRANT OF LEFT BREAST: ICD-10-CM

## 2024-08-08 PROCEDURE — 77066 DX MAMMO INCL CAD BI: CPT | Performed by: RADIOLOGY

## 2024-08-08 PROCEDURE — 76642 ULTRASOUND BREAST LIMITED: CPT | Mod: 50

## 2024-08-08 PROCEDURE — 76642 ULTRASOUND BREAST LIMITED: CPT | Performed by: RADIOLOGY

## 2024-08-08 PROCEDURE — 77062 BREAST TOMOSYNTHESIS BI: CPT | Performed by: RADIOLOGY

## 2024-08-08 PROCEDURE — 77062 BREAST TOMOSYNTHESIS BI: CPT

## 2024-08-09 ENCOUNTER — APPOINTMENT (OUTPATIENT)
Dept: RADIOLOGY | Facility: HOSPITAL | Age: 44
End: 2024-08-09
Payer: COMMERCIAL

## 2024-08-12 ENCOUNTER — APPOINTMENT (OUTPATIENT)
Dept: PRIMARY CARE | Facility: CLINIC | Age: 44
End: 2024-08-12
Payer: COMMERCIAL

## 2024-08-12 VITALS
HEIGHT: 65 IN | DIASTOLIC BLOOD PRESSURE: 86 MMHG | TEMPERATURE: 98.2 F | SYSTOLIC BLOOD PRESSURE: 116 MMHG | WEIGHT: 180 LBS | HEART RATE: 69 BPM | BODY MASS INDEX: 29.99 KG/M2

## 2024-08-12 DIAGNOSIS — E03.9 HYPOTHYROIDISM, UNSPECIFIED TYPE: Primary | ICD-10-CM

## 2024-08-12 DIAGNOSIS — M25.859 FEMORAL ACETABULAR IMPINGEMENT: ICD-10-CM

## 2024-08-12 DIAGNOSIS — M25.559 ARTHRALGIA OF HIP, UNSPECIFIED LATERALITY: ICD-10-CM

## 2024-08-12 PROCEDURE — 3008F BODY MASS INDEX DOCD: CPT | Performed by: FAMILY MEDICINE

## 2024-08-12 PROCEDURE — 99213 OFFICE O/P EST LOW 20 MIN: CPT | Performed by: FAMILY MEDICINE

## 2024-08-12 RX ORDER — LEVOTHYROXINE SODIUM 75 UG/1
75 TABLET ORAL DAILY
Qty: 90 TABLET | Refills: 1 | Status: SHIPPED | OUTPATIENT
Start: 2024-08-12 | End: 2025-08-12

## 2024-08-12 NOTE — PROGRESS NOTES
"Subjective   Chief complaint: Madhavi Brownlee is a 44 y.o. female who presents for Follow-up.    HPI:  HPI  Chronic disease management.  Active problems noted in [facility].  Labs reviewed.  TSH has normalized.  Overall feels better.  She did gain some weight.  Other active problems noted.  Follow-up as noted.  For now we will continue to medical therapy reports lifestyle modifications physical activity as tolerated healthy diet medication compliance encouraged.  Follow-up as scheduled.  Labs prior to follow-up.  Objective   /86   Pulse 69   Temp 36.8 °C (98.2 °F)   Ht 1.651 m (5' 5\")   Wt 81.6 kg (180 lb)   LMP 07/05/2024   BMI 29.95 kg/m²   Physical Exam  Vitals and nursing note reviewed.   Constitutional:       Appearance: Normal appearance.   HENT:      Head: Normocephalic and atraumatic.   Eyes:      Extraocular Movements: Extraocular movements intact.      Conjunctiva/sclera: Conjunctivae normal.      Pupils: Pupils are equal, round, and reactive to light.   Cardiovascular:      Rate and Rhythm: Normal rate and regular rhythm.      Heart sounds: Normal heart sounds.   Pulmonary:      Effort: Pulmonary effort is normal.      Breath sounds: Normal breath sounds.   Abdominal:      General: Abdomen is flat. Bowel sounds are normal.      Palpations: Abdomen is soft.   Musculoskeletal:         General: Normal range of motion.   Skin:     General: Skin is warm and dry.   Neurological:      General: No focal deficit present.      Mental Status: She is alert and oriented to person, place, and time. Mental status is at baseline.   Psychiatric:         Mood and Affect: Mood normal.         Behavior: Behavior normal.       Review of Systems   I have reviewed and reconciled the medication list with the patient today.   Current Outpatient Medications:     amphetamine-dextroamphetamine (Adderall) 30 mg tablet, Take 1 tablet (30 mg) by mouth once daily at noon. Take before meals.. Do not fill before July 19, 2024., " Disp: 30 tablet, Rfl: 0    [START ON 8/16/2024] amphetamine-dextroamphetamine (Adderall) 30 mg tablet, Take 1 tablet (30 mg) by mouth once daily at noon. Take before meals.. Do not fill before August 16, 2024., Disp: 30 tablet, Rfl: 0    amphetamine-dextroamphetamine XR (Adderall XR) 30 mg 24 hr capsule, Take 1 capsule (30 mg) by mouth once daily in the morning. Do not crush or chew. Do not fill before July 19, 2024., Disp: 30 capsule, Rfl: 0    [START ON 8/16/2024] amphetamine-dextroamphetamine XR (Adderall XR) 30 mg 24 hr capsule, Take 1 capsule (30 mg) by mouth once daily in the morning. Do not crush or chew. Do not fill before August 16, 2024., Disp: 30 capsule, Rfl: 0    ibuprofen 600 mg tablet, Take 1 tablet (600 mg) by mouth every 8 hours if needed., Disp: , Rfl:     naloxone (Narcan) 4 mg/0.1 mL nasal spray, Administer into affected nostril(s)., Disp: , Rfl:     Nicoderm CQ 21 mg/24 hr patch, apply 1 patch to CLEAN, DRY, AND INTACT SKIN once daily REMOVE ev...  (REFER TO PRESCRIPTION NOTES)., Disp: , Rfl:     nicotine polacrilex (Nicorette) 4 mg gum, Chew 1 each (4 mg) if needed for smoking cessation., Disp: 100 each, Rfl: 1    Suboxone 8-2 mg SL film, Place 1 Film under the tongue 3 times a day. Do not fill before July 19, 2024., Disp: 90 Film, Rfl: 1    levothyroxine (Synthroid, Levoxyl) 75 mcg tablet, Take 1 tablet (75 mcg) by mouth once daily., Disp: 90 tablet, Rfl: 1     Imaging:  BI mammo bilateral diagnostic tomosynthesis    Result Date: 8/8/2024  Interpreted By:  Terry Escoto, STUDY: BI MAMMO BILATERAL DIAGNOSTIC TOMOSYNTHESIS; BI US BREAST LIMITED BILATERAL;  8/8/2024 9:02 am; 8/8/2024 9:46 am   ACCESSION NUMBER(S): [id number]; [id number]   ORDERING CLINICIAN: KATINA RAMACHANDRAN   INDICATION: Previous left axillary painful lump although currently no longer able to palpate.   COMPARISON: This is a baseline exam.   FINDINGS: MAMMOGRAPHY: 2D and tomosynthesis images were reviewed at 1 mm slice  thickness.   Density:  There are areas of scattered fibroglandular tissue.   Within the right breast there is a small ovoid circumscribed mass in the upper outer quadrant at middle depth. No tumoral calcification or architectural distortion is seen.   Within the left breast, nonspecific mildly prominent axillary lymph nodes are seen on axillary tail view. Otherwise no suspicious masses or calcifications are identified.   ULTRASOUND: Targeted ultrasound was performed of the right breast upper outer quadrant and left axillary region by a registered sonographer.   Within the right breast at the 9 o'clock position 4 cm from nipple edge there is a small circumscribed ovoid peripherally hypoechoic centrally hyperechoic intramammary lymph node measuring 6 x 3 x 3 mm.   Survey of the left axillary region in the area of previous lump demonstrates a nonspecific mildly prominent lymph node which maintains a benign sonographic morphology with hyperechoic hilum and thin peripheral hypoechoic cortex measuring 2.1 x 0.8 x 0.7 cm (cortical thickness = 1 mm).       Right breast small benign intramammary lymph node in the upper outer quadrant at middle depth.   Left axillary nonspecific mildly prominent lymph node with benign morphology.   BI-RADS CATEGORY: BI-RADS Category:  2 Benign. Recommendation:  Clinical Follow-up and Continued Annual Screening. Recommended Date:  1 Year. Laterality:  Bilateral.   For any future breast imaging appointments, please call [phone]).     MACRO: None   Signed by: Terry Escoto 8/8/2024 9:51 AM Dictation workstation:   [id number]    BI US breast limited bilateral    Result Date: 8/8/2024  Interpreted By:  Terry Escoto, STUDY: BI MAMMO BILATERAL DIAGNOSTIC TOMOSYNTHESIS; BI US BREAST LIMITED BILATERAL;  8/8/2024 9:02 am; 8/8/2024 9:46 am   ACCESSION NUMBER(S): [id number]; [id number]   ORDERING CLINICIAN: KATINA RAMACHANDRAN   INDICATION: Previous left axillary painful lump  although currently no longer able to palpate.   COMPARISON: This is a baseline exam.   FINDINGS: MAMMOGRAPHY: 2D and tomosynthesis images were reviewed at 1 mm slice thickness.   Density:  There are areas of scattered fibroglandular tissue.   Within the right breast there is a small ovoid circumscribed mass in the upper outer quadrant at middle depth. No tumoral calcification or architectural distortion is seen.   Within the left breast, nonspecific mildly prominent axillary lymph nodes are seen on axillary tail view. Otherwise no suspicious masses or calcifications are identified.   ULTRASOUND: Targeted ultrasound was performed of the right breast upper outer quadrant and left axillary region by a registered sonographer.   Within the right breast at the 9 o'clock position 4 cm from nipple edge there is a small circumscribed ovoid peripherally hypoechoic centrally hyperechoic intramammary lymph node measuring 6 x 3 x 3 mm.   Survey of the left axillary region in the area of previous lump demonstrates a nonspecific mildly prominent lymph node which maintains a benign sonographic morphology with hyperechoic hilum and thin peripheral hypoechoic cortex measuring 2.1 x 0.8 x 0.7 cm (cortical thickness = 1 mm).       Right breast small benign intramammary lymph node in the upper outer quadrant at middle depth.   Left axillary nonspecific mildly prominent lymph node with benign morphology.   BI-RADS CATEGORY: BI-RADS Category:  2 Benign. Recommendation:  Clinical Follow-up and Continued Annual Screening. Recommended Date:  1 Year. Laterality:  Bilateral.   For any future breast imaging appointments, please call [phone]).     MACRO: None   Signed by: Terry Escoto 8/8/2024 9:51 AM Dictation workstation:   [id number]       Labs reviewed:    Lab Results   Component Value Date    WBC 10.9 01/12/2024    HGB 12.9 01/12/2024    HCT 40.5 01/12/2024     01/12/2024    CHOL 154 01/12/2024    TRIG 69 01/12/2024     HDL 50.2 01/12/2024    ALT 24 01/12/2024    AST 22 01/12/2024     01/12/2024    K 4.5 01/12/2024     01/12/2024    CREATININE 0.78 01/12/2024    BUN 18 01/12/2024    CO2 26 01/12/2024    TSH 1.15 08/06/2024       Assessment/Plan   Problem List Items Addressed This Visit             ICD-10-CM    Femoral acetabular impingement M25.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

## 2024-08-15 ENCOUNTER — PATIENT MESSAGE (OUTPATIENT)
Dept: BEHAVIORAL HEALTH | Facility: CLINIC | Age: 44
End: 2024-08-15
Payer: COMMERCIAL

## 2024-08-15 DIAGNOSIS — F90.2 ADHD (ATTENTION DEFICIT HYPERACTIVITY DISORDER), COMBINED TYPE: ICD-10-CM

## 2024-08-16 ENCOUNTER — TELEPHONE (OUTPATIENT)
Dept: BEHAVIORAL HEALTH | Facility: CLINIC | Age: 44
End: 2024-08-16
Payer: COMMERCIAL

## 2024-08-16 DIAGNOSIS — F11.20 OPIOID USE DISORDER, SEVERE, DEPENDENCE (MULTI): ICD-10-CM

## 2024-08-16 RX ORDER — BUPRENORPHINE HYDROCHLORIDE, NALOXONE HYDROCHLORIDE 8; 2 MG/1; MG/1
1 FILM, SOLUBLE BUCCAL; SUBLINGUAL 3 TIMES DAILY
Qty: 90 FILM | Refills: 0 | Status: SHIPPED | OUTPATIENT
Start: 2024-08-16 | End: 2024-08-21 | Stop reason: SDUPTHER

## 2024-08-19 DIAGNOSIS — F90.2 ADHD (ATTENTION DEFICIT HYPERACTIVITY DISORDER), COMBINED TYPE: ICD-10-CM

## 2024-08-19 RX ORDER — DEXTROAMPHETAMINE SACCHARATE, AMPHETAMINE ASPARTATE, DEXTROAMPHETAMINE SULFATE AND AMPHETAMINE SULFATE 7.5; 7.5; 7.5; 7.5 MG/1; MG/1; MG/1; MG/1
30 TABLET ORAL
Qty: 30 TABLET | Refills: 0 | Status: SHIPPED | OUTPATIENT
Start: 2024-08-19 | End: 2024-09-18

## 2024-08-19 RX ORDER — DEXTROAMPHETAMINE SACCHARATE, AMPHETAMINE ASPARTATE MONOHYDRATE, DEXTROAMPHETAMINE SULFATE AND AMPHETAMINE SULFATE 7.5; 7.5; 7.5; 7.5 MG/1; MG/1; MG/1; MG/1
30 CAPSULE, EXTENDED RELEASE ORAL EVERY MORNING
Qty: 30 CAPSULE | Refills: 0 | Status: SHIPPED | OUTPATIENT
Start: 2024-08-19 | End: 2024-09-18

## 2024-08-20 RX ORDER — DEXTROAMPHETAMINE SACCHARATE, AMPHETAMINE ASPARTATE MONOHYDRATE, DEXTROAMPHETAMINE SULFATE AND AMPHETAMINE SULFATE 7.5; 7.5; 7.5; 7.5 MG/1; MG/1; MG/1; MG/1
30 CAPSULE, EXTENDED RELEASE ORAL EVERY MORNING
Qty: 30 CAPSULE | Refills: 0 | Status: SHIPPED | OUTPATIENT
Start: 2024-08-20 | End: 2024-09-19

## 2024-08-20 RX ORDER — DEXTROAMPHETAMINE SACCHARATE, AMPHETAMINE ASPARTATE, DEXTROAMPHETAMINE SULFATE AND AMPHETAMINE SULFATE 7.5; 7.5; 7.5; 7.5 MG/1; MG/1; MG/1; MG/1
30 TABLET ORAL
Qty: 30 TABLET | Refills: 0 | Status: SHIPPED | OUTPATIENT
Start: 2024-08-20 | End: 2024-09-19

## 2024-08-21 ENCOUNTER — APPOINTMENT (OUTPATIENT)
Dept: BEHAVIORAL HEALTH | Facility: CLINIC | Age: 44
End: 2024-08-21
Payer: COMMERCIAL

## 2024-08-21 VITALS
SYSTOLIC BLOOD PRESSURE: 147 MMHG | BODY MASS INDEX: 30.27 KG/M2 | RESPIRATION RATE: 18 BRPM | HEART RATE: 86 BPM | HEIGHT: 65 IN | TEMPERATURE: 98.4 F | DIASTOLIC BLOOD PRESSURE: 85 MMHG | WEIGHT: 181.7 LBS

## 2024-08-21 DIAGNOSIS — F17.200 MODERATE TOBACCO USE DISORDER: ICD-10-CM

## 2024-08-21 DIAGNOSIS — F11.20 OPIOID USE DISORDER, SEVERE, DEPENDENCE (MULTI): ICD-10-CM

## 2024-08-21 DIAGNOSIS — F90.2 ADHD (ATTENTION DEFICIT HYPERACTIVITY DISORDER), COMBINED TYPE: ICD-10-CM

## 2024-08-21 PROCEDURE — 3008F BODY MASS INDEX DOCD: CPT | Performed by: CLINICAL NURSE SPECIALIST

## 2024-08-21 PROCEDURE — 99214 OFFICE O/P EST MOD 30 MIN: CPT | Performed by: CLINICAL NURSE SPECIALIST

## 2024-08-21 RX ORDER — BUPRENORPHINE HYDROCHLORIDE, NALOXONE HYDROCHLORIDE 8; 2 MG/1; MG/1
1 FILM, SOLUBLE BUCCAL; SUBLINGUAL 3 TIMES DAILY
Qty: 90 FILM | Refills: 1 | Status: SHIPPED | OUTPATIENT
Start: 2024-09-16 | End: 2024-11-15

## 2024-08-21 ASSESSMENT — PAIN SCALES - GENERAL: PAINLEVEL: 0-NO PAIN

## 2024-08-21 NOTE — PROGRESS NOTES
Outpatient Psychiatry      Subjective   Madhavi Brownlee, is a 44 y.o. female,  seen in follow-up.      Assessment/Plan   Problem List Items Addressed This Visit             ICD-10-CM    ADHD (attention deficit hyperactivity disorder), combined type F90.2     Tolerating and benefiting from current regimen. No indication for medication changes today.    Continue Adderall XR 30 mg PO daily and Adderall IR 30 mg PO in the afternoon- refills placed on file at pharmacy today.     Reviewed UDS from 12/13/2023- consistent with current medications, no discrepancies. Next UDS to be completed at follow up.     Reviewed OARRS, no discrepancies or concerns. Discussed risk of insomnia, anxiety, agitation, anorexia, autonomic effects, toxicity, psychosis, dependence, tolerance, abuse.          Moderate tobacco use disorder F17.200     Educated re: the health risks associated with the use of nicotine/tobacco products, and counseled on the importance of cessation. Discussed options for nicotine replacement and/or pharmacotherapies to aid in cessation (e.g. varenicline, bupropion). Not currently interested in cessation assistance.           Opioid use disorder, severe, dependence (Multi) F11.20     Tolerating and benefiting from current regimen. No indication for medication changes today.    Continue Suboxone 8-2 mg SL TID- refill sent to pharmacy today.     Discussed that long term, plan will be to decrease Suboxone dose to within recommended dosing range for tx of OUD (16 mg total daily or less). She has been at current dose for several years, previously managed by an addictions psychiatrist. Will defer changes to dosing today given mutliple psychosocial stressors contributing to risk.    Reviewed UDS from 12/13/2023- consistent with current rx, no discrepancies. Next UDS to be collected at follow up.   Has naloxone kit at home.     Reviewed OARRS, no discrepancies or concerns. Discussed risk of motor/cognitive impairment,  sedation, dependence, tolerance, abuse, withdrawal sequelae, accidental overdose, life-threatening respiratory depression (nolan. in combination with alcohol, benzodiazepines and/or other opioids), excess sedation in combination with other sedating medicines.             Relevant Medications    Suboxone 8-2 mg SL film (Start on 2024)       Follow-up in 3 months.     Risk Assessment:  Risk Assessment: Madhavi Brownlee is currently a low acute risk of suicide and self-harm due to no past suicide attempt(s) and not currently endorsing thoughts of suicide. Madhavi Brownlee is currently a low acute risk of violence and harm to others due to no past history of violence and not currently threatening others.  Suicidal Risk Factors:  and multiple family stressors.   Violence Risk Factors: none  Protective Factors: strong coping skills, sense of responsibility towards family, social support/connectedness, moral objections to suicide, child related concerns/living with child <18 years, positive family relationships, hopefulness/future orientation, and marriage/partnership.      Reason for Visit:  Follow-up for medication management.     HPI:  Since her last visit,     Things have been a mess since pharmacy closed.   New pharmacy doesn't always make her aware that meds have been filled, or that they're out of stock.     Daughter who has been actively using is now pregnant.   Her (pt's) mom and dad had to get a restraining order against her.     Other daughter has been struggling- not sleeping, not stepping up since child's father . Kids are grieving, acting out.     Dad's health is still very poor. He can be very demanding and angry at times.     Feels overwhelmed by everything, just wants a break.   Saving up for a trip to [place]- has always wanted to go. Plans to go alone when she's saved the money. Family doesn't think she's capable of going on her own, which frustrates her.   Does so much for everyone else,  "and just wants some time to herself/to do something for herself.       Denies suicidal ideation or a passive death wish.     No new medications. GYN found 2 lumps on her breast, have to be monitored over the next several months, but so far appear to be benign.       Current Psychiatric Medications:  Suboxone 8-2 mg SL TID  Adderall XR 30 mg PO daily  Adderall IR 30 mg PO daily in the afternoon      Record Review: brief     Medical Review Of Systems:  Pertinent items are noted in HPI.    Psychiatric Review Of Systems:  As noted in HPI.        Objective   Mental Status Exam  General Appearance: Well groomed, appropriate eye contact  Attitude/Behavior: Cooperative  Motor: No psychomotor agitation or retardation, no tremor or other abnormal movements  Speech: Normal rate, volume, prosody  Gait/Station: WFL - Within functional limits  Mood: \"Things have been a mess.\"  Affect: Anxious, Congruent with mood and topic of conversation  Thought Process: Linear, goal directed  Thought Associations: No loosening of associations  Thought Content: Other: (comment) (anxious themes r/t family stressors.)  Perception: No perceptual abnormalities noted  Sensorium: Alert and oriented to person, place, time and situation  Insight: Intact  Judgement: Intact  Cognition: Cognitively intact to conversational testing with respect to attention, orientation, fund of knowledge, recent and remote memory, and language    Vitals:  Vitals:    08/21/24 0838   BP: 147/85   Pulse: 86   Resp: 18   Temp: 36.9 °C (98.4 °F)       Labs:  Lab on 08/06/2024   Component Date Value    Thyroid Stimulating Horm* 08/06/2024 1.15     Thyroxine, Free 08/06/2024 1.03    Office Visit on 07/25/2024   Component Date Value    Case Report 07/25/2024                      Value:Gynecologic Cytology                              Case: [id number]                                   Authorizing Provider:  Arnold Chauhan MD   Collected:           07/25/2024 0943            "   Ordering Location:     [facility]  Received:            07/25/2024 0943              First Screen:          RADHA Rg                                                    Pathologist:           Oriana Barrett MD                                                         Specimen:    ThinPrep Liquid-Based Pap-Imaging System Screen, CERVIX, SCREENING                         Final Cytological Interp* 07/25/2024                      Value:    A. THINPREP PAP CERVIX, SCREENING -     Specimen Adequacy  Satisfactory for evaluation; endocervical/transformation zone component is present    General Categorization  Negative for intraepithelial lesion, other. See interpretation.    Descriptive Interpretation  Negative for intraepithelial lesion or malignancy  Reactive cellular changes associated with inflammation              07/25/2024                      Value:Slide(s) initially screened by RAD[facility] Rg at [facility] [id number] [id number]  By the signature on this report, the individual or group listed as making the Final Interpretation/Diagnosis certifies that they have reviewed this case.       ThinPrep Imaging System 07/25/2024                      Value:This specimen has been analyzed by the ThinPrep Imaging System ([employer] an automated imaging and review system, which assists the laboratory in evaluating cells on ThinPrep Pap tests. Following automated imaging, selected fields from every slide were reviewed by a cytotechnologist and/or pathologist.        Educational Note 07/25/2024                      Value:Cervical cytology is a screening procedure primarily for squamous cancers and precursors and has associated false-negative and false-positives results as evidenced by published data. Your patient's test should be interpreted in this context, together with the patient's history and clinical findings. Regular sampling and follow-up of  unexplained clinical signs and symptoms are recommended to minimize false negative results.      Perform HPV HR test? 2024 Always (all interpretations)     Include HPV Genotype? 2024 Yes     Additional Testin2024 Chlamydia + Gonorrhea  Trichomonas     LMP 2024     HPV, high-risk 2024 Negative     HPV Type 16 DNA 2024 Negative     HPV Type 18 DNA 2024 Negative     HPV non-Type 16 or 18 DNA 2024 Negative     Neisseria gonorrhea,Ampl* 2024 Negative     Chlamydia trachomatis, A* 2024 Negative     Trichomonas Vaginalis 2024 Negative    Lab on 2024   Component Date Value    Thyroid Stimulating Horm* 2024 10.87 (H)     Thyroxine, Free 2024 0.85    Office Visit on 2024   Component Date Value    BUPRENORPHINE GLUC, URINE 2024 520     BUPRENORPHINE ,URINE 2024 4     NALOXONE, URINE 2024 <100     NORBUPRENORPHINE GLUC,UR* 2024 858     NORBUPRENORPHINE, URINE 2024 386     Creatinine, Urine Random 2024 161.8     Amphetamine Screen, Urine 2024 Presumptive Positive (A)     Barbiturate Screen, Urine 2024 Presumptive Negative     Cannabinoid Screen, Urine 2024 Presumptive Negative     Cocaine Metabolite Scree* 2024 Presumptive Negative     PCP Screen, Urine 2024 Presumptive Negative     Clonazepam 2024 <25     7-Aminoclonazepam 2024 <25     Alprazolam 2024 <25     Alpha-Hydroxyalprazolam 2024 <25     Midazolam 2024 <25     Alpha-Hydroxymidazolam 2024 <25     Chlordiazepoxide 2024 <25     Diazepam 2024 <25     Nordiazepam 2024 <25     Temazepam 2024 <25     Oxazepam 2024 <25     Lorazepam 2024 <25     Methadone 2024 <25     EDDP 2024 <25     6-Acetylmorphine 2024 <25     Codeine 2024 <50     Hydrocodone 2024 <25     Hydromorphone 2024 <25     Morphine  2024  <50     Norhydrocodone 05/29/2024 <25     Noroxycodone 05/29/2024 <25     Oxycodone 05/29/2024 <25     Oxymorphone 05/29/2024 <25     Fentanyl 05/29/2024 <2.5     Norfentanyl 05/29/2024 <2.5     Tramadol 05/29/2024 <50     O-Desmethyltramadol 05/29/2024 <50     Zolpidem 05/29/2024 <25     Zolpidem Metabolite (ZCA) 05/29/2024 <25     Methamphetamine Quant, Ur 05/29/2024 <200     MDA, Urine 05/29/2024 <200     MDEA, Urine 05/29/2024 <200     Phentermine,Urine 05/29/2024 <200     Amphetamines,Urine 05/29/2024 >5000     MDMA, Urine 05/29/2024 <200    Lab on 04/22/2024   Component Date Value    Thyroid Stimulating Horm* 04/22/2024 6.04 (H)     Thyroxine, Free 04/22/2024 0.83            Brandi Howard, APRN-CNP, APRN-CNS

## 2024-08-21 NOTE — ASSESSMENT & PLAN NOTE
Tolerating and benefiting from current regimen. No indication for medication changes today.    Continue Suboxone 8-2 mg SL TID- refill sent to pharmacy today.     Discussed that long term, plan will be to decrease Suboxone dose to within recommended dosing range for tx of OUD (16 mg total daily or less). She has been at current dose for several years, previously managed by an addictions psychiatrist. Will defer changes to dosing today given mutliple psychosocial stressors contributing to risk.    Reviewed UDS from 12/13/2023- consistent with current rx, no discrepancies. Next UDS to be collected at follow up.   Has naloxone kit at home.     Reviewed OARRS, no discrepancies or concerns. Discussed risk of motor/cognitive impairment, sedation, dependence, tolerance, abuse, withdrawal sequelae, accidental overdose, life-threatening respiratory depression (nolan. in combination with alcohol, benzodiazepines and/or other opioids), excess sedation in combination with other sedating medicines.

## 2024-09-10 DIAGNOSIS — F90.2 ADHD (ATTENTION DEFICIT HYPERACTIVITY DISORDER), COMBINED TYPE: ICD-10-CM

## 2024-09-10 RX ORDER — DEXTROAMPHETAMINE SACCHARATE, AMPHETAMINE ASPARTATE, DEXTROAMPHETAMINE SULFATE AND AMPHETAMINE SULFATE 7.5; 7.5; 7.5; 7.5 MG/1; MG/1; MG/1; MG/1
30 TABLET ORAL
Qty: 30 TABLET | Refills: 0 | Status: SHIPPED | OUTPATIENT
Start: 2024-10-14 | End: 2024-11-13

## 2024-09-10 RX ORDER — DEXTROAMPHETAMINE SACCHARATE, AMPHETAMINE ASPARTATE MONOHYDRATE, DEXTROAMPHETAMINE SULFATE AND AMPHETAMINE SULFATE 7.5; 7.5; 7.5; 7.5 MG/1; MG/1; MG/1; MG/1
30 CAPSULE, EXTENDED RELEASE ORAL EVERY MORNING
Qty: 30 CAPSULE | Refills: 0 | Status: SHIPPED | OUTPATIENT
Start: 2024-09-16 | End: 2024-10-16

## 2024-09-10 RX ORDER — DEXTROAMPHETAMINE SACCHARATE, AMPHETAMINE ASPARTATE MONOHYDRATE, DEXTROAMPHETAMINE SULFATE AND AMPHETAMINE SULFATE 7.5; 7.5; 7.5; 7.5 MG/1; MG/1; MG/1; MG/1
30 CAPSULE, EXTENDED RELEASE ORAL EVERY MORNING
Qty: 30 CAPSULE | Refills: 0 | Status: SHIPPED | OUTPATIENT
Start: 2024-10-14 | End: 2024-11-13

## 2024-09-10 RX ORDER — DEXTROAMPHETAMINE SACCHARATE, AMPHETAMINE ASPARTATE, DEXTROAMPHETAMINE SULFATE AND AMPHETAMINE SULFATE 7.5; 7.5; 7.5; 7.5 MG/1; MG/1; MG/1; MG/1
30 TABLET ORAL
Qty: 30 TABLET | Refills: 0 | Status: SHIPPED | OUTPATIENT
Start: 2024-09-16 | End: 2024-10-16

## 2024-09-20 DIAGNOSIS — F90.2 ADHD (ATTENTION DEFICIT HYPERACTIVITY DISORDER), COMBINED TYPE: ICD-10-CM

## 2024-09-20 RX ORDER — DEXTROAMPHETAMINE SACCHARATE, AMPHETAMINE ASPARTATE, DEXTROAMPHETAMINE SULFATE AND AMPHETAMINE SULFATE 7.5; 7.5; 7.5; 7.5 MG/1; MG/1; MG/1; MG/1
30 TABLET ORAL DAILY
Qty: 25 TABLET | Refills: 0 | Status: SHIPPED | OUTPATIENT
Start: 2024-09-20

## 2024-09-23 DIAGNOSIS — F90.2 ADHD (ATTENTION DEFICIT HYPERACTIVITY DISORDER), COMBINED TYPE: ICD-10-CM

## 2024-09-23 RX ORDER — DEXTROAMPHETAMINE SACCHARATE, AMPHETAMINE ASPARTATE, DEXTROAMPHETAMINE SULFATE AND AMPHETAMINE SULFATE 7.5; 7.5; 7.5; 7.5 MG/1; MG/1; MG/1; MG/1
30 TABLET ORAL DAILY
Qty: 23 TABLET | Refills: 0 | Status: SHIPPED | OUTPATIENT
Start: 2024-09-23 | End: 2024-09-24 | Stop reason: SDUPTHER

## 2024-09-24 DIAGNOSIS — F90.2 ADHD (ATTENTION DEFICIT HYPERACTIVITY DISORDER), COMBINED TYPE: ICD-10-CM

## 2024-09-24 RX ORDER — DEXTROAMPHETAMINE SACCHARATE, AMPHETAMINE ASPARTATE, DEXTROAMPHETAMINE SULFATE AND AMPHETAMINE SULFATE 7.5; 7.5; 7.5; 7.5 MG/1; MG/1; MG/1; MG/1
30 TABLET ORAL DAILY
Qty: 15 TABLET | Refills: 0 | Status: SHIPPED | OUTPATIENT
Start: 2024-09-30 | End: 2024-10-15

## 2024-09-25 ENCOUNTER — PREP FOR PROCEDURE (OUTPATIENT)
Dept: SURGERY | Facility: HOSPITAL | Age: 44
End: 2024-09-25
Payer: COMMERCIAL

## 2024-09-25 RX ORDER — SODIUM CHLORIDE, SODIUM LACTATE, POTASSIUM CHLORIDE, CALCIUM CHLORIDE 600; 310; 30; 20 MG/100ML; MG/100ML; MG/100ML; MG/100ML
100 INJECTION, SOLUTION INTRAVENOUS CONTINUOUS
OUTPATIENT
Start: 2024-09-25

## 2024-09-26 ENCOUNTER — HOSPITAL ENCOUNTER (OUTPATIENT)
Dept: GASTROENTEROLOGY | Facility: HOSPITAL | Age: 44
Setting detail: OUTPATIENT SURGERY
Discharge: HOME | End: 2024-09-26
Payer: COMMERCIAL

## 2024-09-26 DIAGNOSIS — Z12.11 COLON CANCER SCREENING: ICD-10-CM

## 2024-09-30 DIAGNOSIS — F90.2 ADHD (ATTENTION DEFICIT HYPERACTIVITY DISORDER), COMBINED TYPE: ICD-10-CM

## 2024-09-30 RX ORDER — DEXTROAMPHETAMINE SACCHARATE, AMPHETAMINE ASPARTATE, DEXTROAMPHETAMINE SULFATE AND AMPHETAMINE SULFATE 7.5; 7.5; 7.5; 7.5 MG/1; MG/1; MG/1; MG/1
30 TABLET ORAL
Qty: 30 TABLET | Refills: 0 | Status: SHIPPED | OUTPATIENT
Start: 2024-09-30 | End: 2024-10-15

## 2024-10-14 DIAGNOSIS — F90.2 ADHD (ATTENTION DEFICIT HYPERACTIVITY DISORDER), COMBINED TYPE: ICD-10-CM

## 2024-10-16 RX ORDER — DEXTROAMPHETAMINE SACCHARATE, AMPHETAMINE ASPARTATE, DEXTROAMPHETAMINE SULFATE AND AMPHETAMINE SULFATE 7.5; 7.5; 7.5; 7.5 MG/1; MG/1; MG/1; MG/1
30 TABLET ORAL
Qty: 60 TABLET | Refills: 0 | Status: SHIPPED | OUTPATIENT
Start: 2024-10-18 | End: 2024-11-17

## 2024-11-06 ENCOUNTER — APPOINTMENT (OUTPATIENT)
Dept: BEHAVIORAL HEALTH | Facility: CLINIC | Age: 44
End: 2024-11-06
Payer: COMMERCIAL

## 2024-11-06 VITALS
DIASTOLIC BLOOD PRESSURE: 83 MMHG | WEIGHT: 186.2 LBS | HEIGHT: 65 IN | HEART RATE: 94 BPM | SYSTOLIC BLOOD PRESSURE: 123 MMHG | BODY MASS INDEX: 31.02 KG/M2 | TEMPERATURE: 98.4 F | RESPIRATION RATE: 18 BRPM

## 2024-11-06 DIAGNOSIS — Z00.00 HEALTHCARE MAINTENANCE: ICD-10-CM

## 2024-11-06 DIAGNOSIS — F11.20 OPIOID USE DISORDER, SEVERE, DEPENDENCE (MULTI): ICD-10-CM

## 2024-11-06 DIAGNOSIS — Z79.899 MEDICATION MANAGEMENT: ICD-10-CM

## 2024-11-06 DIAGNOSIS — F90.2 ADHD (ATTENTION DEFICIT HYPERACTIVITY DISORDER), COMBINED TYPE: ICD-10-CM

## 2024-11-06 DIAGNOSIS — F17.200 MODERATE TOBACCO USE DISORDER: ICD-10-CM

## 2024-11-06 PROCEDURE — 99215 OFFICE O/P EST HI 40 MIN: CPT | Performed by: CLINICAL NURSE SPECIALIST

## 2024-11-06 PROCEDURE — 3008F BODY MASS INDEX DOCD: CPT | Performed by: CLINICAL NURSE SPECIALIST

## 2024-11-06 RX ORDER — DEXTROAMPHETAMINE SACCHARATE, AMPHETAMINE ASPARTATE MONOHYDRATE, DEXTROAMPHETAMINE SULFATE AND AMPHETAMINE SULFATE 7.5; 7.5; 7.5; 7.5 MG/1; MG/1; MG/1; MG/1
30 CAPSULE, EXTENDED RELEASE ORAL DAILY
Qty: 30 CAPSULE | Refills: 0 | Status: SHIPPED | OUTPATIENT
Start: 2024-11-17 | End: 2024-12-17

## 2024-11-06 RX ORDER — DEXTROAMPHETAMINE SACCHARATE, AMPHETAMINE ASPARTATE, DEXTROAMPHETAMINE SULFATE AND AMPHETAMINE SULFATE 7.5; 7.5; 7.5; 7.5 MG/1; MG/1; MG/1; MG/1
30 TABLET ORAL DAILY
Qty: 30 TABLET | Refills: 0 | Status: SHIPPED | OUTPATIENT
Start: 2024-12-15 | End: 2025-01-14

## 2024-11-06 RX ORDER — BUPRENORPHINE HYDROCHLORIDE, NALOXONE HYDROCHLORIDE 8; 2 MG/1; MG/1
1 FILM, SOLUBLE BUCCAL; SUBLINGUAL 3 TIMES DAILY
Qty: 90 FILM | Refills: 2 | Status: SHIPPED | OUTPATIENT
Start: 2024-11-17 | End: 2025-02-15

## 2024-11-06 RX ORDER — DEXTROAMPHETAMINE SACCHARATE, AMPHETAMINE ASPARTATE MONOHYDRATE, DEXTROAMPHETAMINE SULFATE AND AMPHETAMINE SULFATE 7.5; 7.5; 7.5; 7.5 MG/1; MG/1; MG/1; MG/1
30 CAPSULE, EXTENDED RELEASE ORAL DAILY
Qty: 30 CAPSULE | Refills: 0 | Status: SHIPPED | OUTPATIENT
Start: 2025-01-12 | End: 2025-02-11

## 2024-11-06 RX ORDER — DEXTROAMPHETAMINE SACCHARATE, AMPHETAMINE ASPARTATE, DEXTROAMPHETAMINE SULFATE AND AMPHETAMINE SULFATE 7.5; 7.5; 7.5; 7.5 MG/1; MG/1; MG/1; MG/1
30 TABLET ORAL DAILY
Qty: 30 TABLET | Refills: 0 | Status: SHIPPED | OUTPATIENT
Start: 2025-01-12 | End: 2025-02-11

## 2024-11-06 RX ORDER — DEXTROAMPHETAMINE SACCHARATE, AMPHETAMINE ASPARTATE, DEXTROAMPHETAMINE SULFATE AND AMPHETAMINE SULFATE 7.5; 7.5; 7.5; 7.5 MG/1; MG/1; MG/1; MG/1
30 TABLET ORAL DAILY
Qty: 30 TABLET | Refills: 0 | Status: SHIPPED | OUTPATIENT
Start: 2024-11-17 | End: 2024-12-17

## 2024-11-06 RX ORDER — DEXTROAMPHETAMINE SACCHARATE, AMPHETAMINE ASPARTATE MONOHYDRATE, DEXTROAMPHETAMINE SULFATE AND AMPHETAMINE SULFATE 7.5; 7.5; 7.5; 7.5 MG/1; MG/1; MG/1; MG/1
30 CAPSULE, EXTENDED RELEASE ORAL DAILY
Qty: 30 CAPSULE | Refills: 0 | Status: SHIPPED | OUTPATIENT
Start: 2024-12-15 | End: 2025-01-14

## 2024-11-06 ASSESSMENT — PAIN SCALES - GENERAL: PAINLEVEL_OUTOF10: 0-NO PAIN

## 2024-11-06 NOTE — ASSESSMENT & PLAN NOTE
Tolerating and benefiting from current regimen. No indication for medication changes today.  Temporarily switch to Adderall IR BID d/t shortage/lack of availability of XR caps. Will resume usual regimen at next fill.     Continue Adderall XR 30 mg PO daily and Adderall IR 30 mg PO in the afternoon- refills placed on file at pharmacy today.     Orders placed today for UDS to be completed prior to next visit.     Reviewed OARRS, no discrepancies or concerns. Discussed risk of insomnia, anxiety, agitation, anorexia, autonomic effects, toxicity, psychosis, dependence, tolerance, abuse.
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
## 2024-11-06 NOTE — ASSESSMENT & PLAN NOTE
Tolerating and benefiting from current regimen. No indication for medication changes today.      Continue Suboxone 8-2 mg SL TID- refill sent to pharmacy today.     Discussed that long term, plan will be to decrease Suboxone dose to within recommended dosing range for tx of OUD (16 mg total daily or less). She has been at current dose for several years, previously managed by an addictions psychiatrist. Will defer changes to dosing today given mutliple psychosocial stressors contributing to risk.    Orders placed today for UDS to be completed prior to next visit.   Has naloxone kit at home.     Reviewed OARRS, no discrepancies or concerns. Discussed risk of motor/cognitive impairment, sedation, dependence, tolerance, abuse, withdrawal sequelae, accidental overdose, life-threatening respiratory depression (nolan. in combination with alcohol, benzodiazepines and/or other opioids), excess sedation in combination with other sedating medicines.

## 2024-11-07 DIAGNOSIS — E03.9 HYPOTHYROIDISM, UNSPECIFIED TYPE: ICD-10-CM

## 2024-11-07 RX ORDER — LEVOTHYROXINE SODIUM 75 UG/1
75 TABLET ORAL DAILY
Qty: 90 TABLET | Refills: 1 | Status: SHIPPED | OUTPATIENT
Start: 2024-11-07 | End: 2025-11-07

## 2024-11-14 ENCOUNTER — APPOINTMENT (OUTPATIENT)
Dept: PRIMARY CARE | Facility: CLINIC | Age: 44
End: 2024-11-14
Payer: COMMERCIAL

## 2024-11-18 ENCOUNTER — TELEPHONE (OUTPATIENT)
Dept: BEHAVIORAL HEALTH | Facility: CLINIC | Age: 44
End: 2024-11-18
Payer: COMMERCIAL

## 2024-11-18 DIAGNOSIS — F90.2 ADHD (ATTENTION DEFICIT HYPERACTIVITY DISORDER), COMBINED TYPE: ICD-10-CM

## 2024-11-18 RX ORDER — DEXTROAMPHETAMINE SACCHARATE, AMPHETAMINE ASPARTATE, DEXTROAMPHETAMINE SULFATE AND AMPHETAMINE SULFATE 7.5; 7.5; 7.5; 7.5 MG/1; MG/1; MG/1; MG/1
30 TABLET ORAL DAILY
Qty: 30 TABLET | Refills: 0 | Status: CANCELLED | OUTPATIENT
Start: 2024-11-18 | End: 2024-12-18

## 2024-11-19 DIAGNOSIS — F90.2 ADHD (ATTENTION DEFICIT HYPERACTIVITY DISORDER), COMBINED TYPE: ICD-10-CM

## 2024-11-19 RX ORDER — DEXTROAMPHETAMINE SACCHARATE, AMPHETAMINE ASPARTATE MONOHYDRATE, DEXTROAMPHETAMINE SULFATE AND AMPHETAMINE SULFATE 7.5; 7.5; 7.5; 7.5 MG/1; MG/1; MG/1; MG/1
30 CAPSULE, EXTENDED RELEASE ORAL DAILY
Qty: 30 CAPSULE | Refills: 0 | Status: SHIPPED | OUTPATIENT
Start: 2024-12-17 | End: 2025-01-16

## 2024-11-19 RX ORDER — DEXTROAMPHETAMINE SACCHARATE, AMPHETAMINE ASPARTATE MONOHYDRATE, DEXTROAMPHETAMINE SULFATE AND AMPHETAMINE SULFATE 7.5; 7.5; 7.5; 7.5 MG/1; MG/1; MG/1; MG/1
30 CAPSULE, EXTENDED RELEASE ORAL DAILY
Qty: 30 CAPSULE | Refills: 0 | Status: SHIPPED | OUTPATIENT
Start: 2024-11-19 | End: 2024-12-19

## 2024-11-19 RX ORDER — DEXTROAMPHETAMINE SACCHARATE, AMPHETAMINE ASPARTATE, DEXTROAMPHETAMINE SULFATE AND AMPHETAMINE SULFATE 7.5; 7.5; 7.5; 7.5 MG/1; MG/1; MG/1; MG/1
30 TABLET ORAL DAILY
Qty: 30 TABLET | Refills: 0 | Status: SHIPPED | OUTPATIENT
Start: 2024-12-17 | End: 2025-01-16

## 2024-11-19 RX ORDER — DEXTROAMPHETAMINE SACCHARATE, AMPHETAMINE ASPARTATE, DEXTROAMPHETAMINE SULFATE AND AMPHETAMINE SULFATE 7.5; 7.5; 7.5; 7.5 MG/1; MG/1; MG/1; MG/1
30 TABLET ORAL DAILY
Qty: 30 TABLET | Refills: 0 | Status: SHIPPED | OUTPATIENT
Start: 2024-11-19 | End: 2024-12-19

## 2024-11-19 RX ORDER — DEXTROAMPHETAMINE SACCHARATE, AMPHETAMINE ASPARTATE MONOHYDRATE, DEXTROAMPHETAMINE SULFATE AND AMPHETAMINE SULFATE 7.5; 7.5; 7.5; 7.5 MG/1; MG/1; MG/1; MG/1
30 CAPSULE, EXTENDED RELEASE ORAL DAILY
Qty: 30 CAPSULE | Refills: 0 | Status: SHIPPED | OUTPATIENT
Start: 2025-01-14 | End: 2025-02-13

## 2024-11-19 RX ORDER — DEXTROAMPHETAMINE SACCHARATE, AMPHETAMINE ASPARTATE, DEXTROAMPHETAMINE SULFATE AND AMPHETAMINE SULFATE 7.5; 7.5; 7.5; 7.5 MG/1; MG/1; MG/1; MG/1
30 TABLET ORAL DAILY
Qty: 30 TABLET | Refills: 0 | Status: SHIPPED | OUTPATIENT
Start: 2025-01-14 | End: 2025-02-13

## 2024-12-09 ENCOUNTER — LAB (OUTPATIENT)
Dept: LAB | Facility: LAB | Age: 44
End: 2024-12-09
Payer: COMMERCIAL

## 2024-12-09 DIAGNOSIS — E03.9 HYPOTHYROIDISM, UNSPECIFIED TYPE: ICD-10-CM

## 2024-12-09 DIAGNOSIS — Z00.00 HEALTHCARE MAINTENANCE: ICD-10-CM

## 2024-12-09 DIAGNOSIS — Z79.899 MEDICATION MANAGEMENT: ICD-10-CM

## 2024-12-09 LAB
ALBUMIN SERPL BCP-MCNC: 3.7 G/DL (ref 3.4–5)
ALP SERPL-CCNC: 71 U/L (ref 33–110)
ALT SERPL W P-5'-P-CCNC: 18 U/L (ref 7–45)
AMPHETAMINES UR QL SCN: ABNORMAL
ANION GAP SERPL CALC-SCNC: 10 MMOL/L (ref 10–20)
AST SERPL W P-5'-P-CCNC: 16 U/L (ref 9–39)
BARBITURATES UR QL SCN: ABNORMAL
BILIRUB SERPL-MCNC: 0.2 MG/DL (ref 0–1.2)
BUN SERPL-MCNC: 11 MG/DL (ref 6–23)
BZE UR QL SCN: ABNORMAL
CALCIUM SERPL-MCNC: 8.4 MG/DL (ref 8.6–10.3)
CANNABINOIDS UR QL SCN: ABNORMAL
CHLORIDE SERPL-SCNC: 105 MMOL/L (ref 98–107)
CO2 SERPL-SCNC: 27 MMOL/L (ref 21–32)
CREAT SERPL-MCNC: 0.77 MG/DL (ref 0.5–1.05)
CREAT UR-MCNC: 241 MG/DL (ref 20–320)
EGFRCR SERPLBLD CKD-EPI 2021: >90 ML/MIN/1.73M*2
GLUCOSE SERPL-MCNC: 116 MG/DL (ref 74–99)
PCP UR QL SCN: ABNORMAL
POTASSIUM SERPL-SCNC: 4 MMOL/L (ref 3.5–5.3)
PROT SERPL-MCNC: 6.7 G/DL (ref 6.4–8.2)
SODIUM SERPL-SCNC: 138 MMOL/L (ref 136–145)
T4 FREE SERPL-MCNC: 0.95 NG/DL (ref 0.61–1.12)
TSH SERPL-ACNC: 13.67 MIU/L (ref 0.44–3.98)

## 2024-12-09 PROCEDURE — 80373 DRUG SCREENING TRAMADOL: CPT

## 2024-12-09 PROCEDURE — 82570 ASSAY OF URINE CREATININE: CPT

## 2024-12-09 PROCEDURE — 80358 DRUG SCREENING METHADONE: CPT

## 2024-12-09 PROCEDURE — 80346 BENZODIAZEPINES1-12: CPT

## 2024-12-09 PROCEDURE — 80307 DRUG TEST PRSMV CHEM ANLYZR: CPT

## 2024-12-09 PROCEDURE — 80365 DRUG SCREENING OXYCODONE: CPT

## 2024-12-09 PROCEDURE — 80354 DRUG SCREENING FENTANYL: CPT

## 2024-12-09 PROCEDURE — 84439 ASSAY OF FREE THYROXINE: CPT

## 2024-12-09 PROCEDURE — 84443 ASSAY THYROID STIM HORMONE: CPT

## 2024-12-09 PROCEDURE — 80361 OPIATES 1 OR MORE: CPT

## 2024-12-09 PROCEDURE — 80368 SEDATIVE HYPNOTICS: CPT

## 2024-12-09 PROCEDURE — 80053 COMPREHEN METABOLIC PANEL: CPT

## 2024-12-11 ENCOUNTER — APPOINTMENT (OUTPATIENT)
Dept: PRIMARY CARE | Facility: CLINIC | Age: 44
End: 2024-12-11
Payer: COMMERCIAL

## 2024-12-12 LAB
BUPRENORPHINE UR-MCNC: 12 NG/ML
BUPRENORPHINE UR-MCNC: >1000 NG/ML
NALOXONE UR CFM-MCNC: <100 NG/ML
NORBUPRENORPHINE UR CFM-MCNC: >1000 NG/ML
NORBUPRENORPHINE UR-MCNC: 763 NG/ML

## 2024-12-18 DIAGNOSIS — F11.20 OPIOID USE DISORDER, SEVERE, DEPENDENCE (MULTI): ICD-10-CM

## 2024-12-18 RX ORDER — BUPRENORPHINE HYDROCHLORIDE, NALOXONE HYDROCHLORIDE 8; 2 MG/1; MG/1
1 FILM, SOLUBLE BUCCAL; SUBLINGUAL 3 TIMES DAILY
Qty: 90 FILM | Refills: 2 | Status: SHIPPED | OUTPATIENT
Start: 2024-12-18 | End: 2025-03-18

## 2025-01-24 ENCOUNTER — APPOINTMENT (OUTPATIENT)
Dept: PRIMARY CARE | Facility: CLINIC | Age: 45
End: 2025-01-24
Payer: COMMERCIAL

## 2025-01-30 ENCOUNTER — APPOINTMENT (OUTPATIENT)
Dept: PRIMARY CARE | Facility: CLINIC | Age: 45
End: 2025-01-30
Payer: COMMERCIAL

## 2025-01-30 VITALS
DIASTOLIC BLOOD PRESSURE: 96 MMHG | OXYGEN SATURATION: 97 % | TEMPERATURE: 97.9 F | BODY MASS INDEX: 31.25 KG/M2 | SYSTOLIC BLOOD PRESSURE: 136 MMHG | WEIGHT: 187.8 LBS | HEART RATE: 76 BPM

## 2025-01-30 DIAGNOSIS — K04.7 DENTAL INFECTION: ICD-10-CM

## 2025-01-30 DIAGNOSIS — E01.0 THYROMEGALY: Primary | ICD-10-CM

## 2025-01-30 DIAGNOSIS — M70.61 TROCHANTERIC BURSITIS OF RIGHT HIP: ICD-10-CM

## 2025-01-30 DIAGNOSIS — F17.200 MODERATE TOBACCO USE DISORDER: ICD-10-CM

## 2025-01-30 DIAGNOSIS — E03.9 HYPOTHYROIDISM, UNSPECIFIED TYPE: ICD-10-CM

## 2025-01-30 DIAGNOSIS — M25.859 FEMORAL ACETABULAR IMPINGEMENT: ICD-10-CM

## 2025-01-30 PROCEDURE — 99214 OFFICE O/P EST MOD 30 MIN: CPT | Performed by: FAMILY MEDICINE

## 2025-01-30 RX ORDER — LEVOTHYROXINE SODIUM 88 UG/1
88 TABLET ORAL DAILY
Qty: 90 TABLET | Refills: 1 | Status: SHIPPED | OUTPATIENT
Start: 2025-01-30 | End: 2026-01-30

## 2025-01-30 RX ORDER — AMOXICILLIN 875 MG/1
875 TABLET, FILM COATED ORAL 2 TIMES DAILY
Qty: 20 TABLET | Refills: 0 | Status: SHIPPED | OUTPATIENT
Start: 2025-01-30 | End: 2025-02-09

## 2025-01-30 RX ORDER — LEVOTHYROXINE SODIUM 75 UG/1
75 TABLET ORAL DAILY
Qty: 90 TABLET | Refills: 1 | Status: SHIPPED | OUTPATIENT
Start: 2025-01-30 | End: 2025-01-30

## 2025-01-30 ASSESSMENT — PATIENT HEALTH QUESTIONNAIRE - PHQ9
1. LITTLE INTEREST OR PLEASURE IN DOING THINGS: NOT AT ALL
SUM OF ALL RESPONSES TO PHQ9 QUESTIONS 1 AND 2: 0
2. FEELING DOWN, DEPRESSED OR HOPELESS: NOT AT ALL

## 2025-01-30 NOTE — PROGRESS NOTES
Subjective   Chief complaint: Madhavi Brownlee is a 44 y.o. female who presents for Follow-up (Patient is here today for a 4 month follow up. Patient is requesting to have antibiotics. States she has an infected tooth and cannot get into the dentist office till next week).    HPI:  HPI  Chronic disease management.  Active problems noted in [facility].  History of hypothyroidism.  We had she has been compliant with her thyroid.  Takes every day.  Reviewed labs.  TSH is significantly elevated.  I am getting increase her thyroid dose.  Repeat a TSH in 6 weeks.  Other active problems are noted.  She follows with behavioral health.  ADHD.  Medication compliant.  She is a smoker.  Encourage smoking cessation.  In addition she complains of an infected tooth.  Right side.  Red warm tender.  She has definitive appointment next week with dentist to get her tooth pulled.  Unguinal put her on an antibiotic in the meantime.  Otherwise no recent cough congestion fevers chills.  Examination noted.  Follow-up in 6 months.  Labs prior to follow-up.  Objective   BP (!) 136/96 (BP Location: Left arm, Patient Position: Sitting, BP Cuff Size: Adult)   Pulse 76   Temp 36.6 °C (97.9 °F) (Temporal)   Wt 85.2 kg (187 lb 12.8 oz)   SpO2 97% Comment: RA  BMI 31.25 kg/m²   Physical Exam  Review of Systems   I have reviewed and reconciled the medication list with the patient today.   Current Outpatient Medications:   •  amphetamine-dextroamphetamine (AdderalL) 30 mg tablet, Take 1 tablet (30 mg) by mouth once daily. Do not fill before January 14, 2025., Disp: 30 tablet, Rfl: 0  •  amphetamine-dextroamphetamine XR (Adderall XR) 30 mg 24 hr capsule, Take 1 capsule (30 mg) by mouth once daily. Do not crush or chew. Do not fill before January 14, 2025., Disp: 30 capsule, Rfl: 0  •  ibuprofen 600 mg tablet, Take 1 tablet (600 mg) by mouth every 8 hours if needed., Disp: , Rfl:   •  Nicoderm CQ 21 mg/24 hr patch, apply 1 patch to CLEAN, DRY, AND  INTACT SKIN once daily REMOVE ev...  (REFER TO PRESCRIPTION NOTES)., Disp: , Rfl:   •  nicotine polacrilex (Nicorette) 4 mg gum, Chew 1 each (4 mg) if needed for smoking cessation., Disp: 100 each, Rfl: 1  •  Suboxone 8-2 mg SL film, Place 1 Film under the tongue 3 times a day. Place 1 Film under the tongue 3 times a day., Disp: 90 Film, Rfl: 2  •  amoxicillin (Amoxil) 875 mg tablet, Take 1 tablet (875 mg) by mouth 2 times a day for 10 days., Disp: 20 tablet, Rfl: 0  •  amphetamine-dextroamphetamine (AdderalL) 30 mg tablet, Take 1 tablet (30 mg) by mouth 2 times daily (morning and midday). Do not fill before October 18, 2024., Disp: 60 tablet, Rfl: 0  •  amphetamine-dextroamphetamine (AdderalL) 30 mg tablet, Take 1 tablet (30 mg) by mouth once daily., Disp: 30 tablet, Rfl: 0  •  amphetamine-dextroamphetamine (AdderalL) 30 mg tablet, Take 1 tablet (30 mg) by mouth once daily. Do not fill before December 17, 2024., Disp: 30 tablet, Rfl: 0  •  amphetamine-dextroamphetamine XR (Adderall XR) 30 mg 24 hr capsule, Take 1 capsule (30 mg) by mouth once daily. Do not crush or chew., Disp: 30 capsule, Rfl: 0  •  amphetamine-dextroamphetamine XR (Adderall XR) 30 mg 24 hr capsule, Take 1 capsule (30 mg) by mouth once daily. Do not crush or chew. Do not fill before December 17, 2024., Disp: 30 capsule, Rfl: 0  •  levothyroxine (Synthroid, Levoxyl) 88 mcg tablet, Take 1 tablet (88 mcg) by mouth once daily., Disp: 90 tablet, Rfl: 1  •  naloxone (Narcan) 4 mg/0.1 mL nasal spray, Administer into affected nostril(s)., Disp: , Rfl:      Imaging:  No results found.     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    Moderate tobacco use disorder F17.200    Trochanteric bursitis of right hip M70.61     Other Visit Diagnoses         Codes    Thyromegaly    -  Primary E01.0    Hypothyroidism, unspecified type     E03.9    Relevant Medications    levothyroxine (Synthroid, Levoxyl) 88 mcg tablet    Other Relevant Orders    Tsh With Reflex To Free T4 If Abnormal    Comprehensive metabolic panel    Lipid panel    Tsh With Reflex To Free T4 If Abnormal    Dental infection     K04.7    Relevant Medications    amoxicillin (Amoxil) 875 mg tablet            Reinforced lifestyle modifications  Continue current medications as listed  Physical activity as tolerated and healthy diet encouraged  Maintain a healthy weight  Follow up in 6mo

## 2025-02-05 ENCOUNTER — APPOINTMENT (OUTPATIENT)
Dept: BEHAVIORAL HEALTH | Facility: CLINIC | Age: 45
End: 2025-02-05
Payer: COMMERCIAL

## 2025-02-05 VITALS
DIASTOLIC BLOOD PRESSURE: 92 MMHG | RESPIRATION RATE: 18 BRPM | HEIGHT: 65 IN | HEART RATE: 79 BPM | BODY MASS INDEX: 31.17 KG/M2 | TEMPERATURE: 97.8 F | SYSTOLIC BLOOD PRESSURE: 144 MMHG | WEIGHT: 187.1 LBS

## 2025-02-05 DIAGNOSIS — F17.200 MODERATE TOBACCO USE DISORDER: ICD-10-CM

## 2025-02-05 DIAGNOSIS — F90.2 ADHD (ATTENTION DEFICIT HYPERACTIVITY DISORDER), COMBINED TYPE: ICD-10-CM

## 2025-02-05 DIAGNOSIS — F11.20 OPIOID USE DISORDER, SEVERE, DEPENDENCE (MULTI): ICD-10-CM

## 2025-02-05 PROCEDURE — 3008F BODY MASS INDEX DOCD: CPT | Performed by: CLINICAL NURSE SPECIALIST

## 2025-02-05 PROCEDURE — 99215 OFFICE O/P EST HI 40 MIN: CPT | Performed by: CLINICAL NURSE SPECIALIST

## 2025-02-05 RX ORDER — DEXTROAMPHETAMINE SACCHARATE, AMPHETAMINE ASPARTATE MONOHYDRATE, DEXTROAMPHETAMINE SULFATE AND AMPHETAMINE SULFATE 7.5; 7.5; 7.5; 7.5 MG/1; MG/1; MG/1; MG/1
30 CAPSULE, EXTENDED RELEASE ORAL DAILY
Qty: 30 CAPSULE | Refills: 0 | Status: SHIPPED | OUTPATIENT
Start: 2025-04-10 | End: 2025-05-10

## 2025-02-05 RX ORDER — DEXTROAMPHETAMINE SACCHARATE, AMPHETAMINE ASPARTATE, DEXTROAMPHETAMINE SULFATE AND AMPHETAMINE SULFATE 7.5; 7.5; 7.5; 7.5 MG/1; MG/1; MG/1; MG/1
30 TABLET ORAL DAILY
Qty: 30 TABLET | Refills: 0 | Status: SHIPPED | OUTPATIENT
Start: 2025-02-13 | End: 2025-03-15

## 2025-02-05 RX ORDER — DEXTROAMPHETAMINE SACCHARATE, AMPHETAMINE ASPARTATE MONOHYDRATE, DEXTROAMPHETAMINE SULFATE AND AMPHETAMINE SULFATE 7.5; 7.5; 7.5; 7.5 MG/1; MG/1; MG/1; MG/1
30 CAPSULE, EXTENDED RELEASE ORAL DAILY
Qty: 30 CAPSULE | Refills: 0 | Status: SHIPPED | OUTPATIENT
Start: 2025-03-13 | End: 2025-04-12

## 2025-02-05 RX ORDER — DEXTROAMPHETAMINE SACCHARATE, AMPHETAMINE ASPARTATE, DEXTROAMPHETAMINE SULFATE AND AMPHETAMINE SULFATE 7.5; 7.5; 7.5; 7.5 MG/1; MG/1; MG/1; MG/1
30 TABLET ORAL DAILY
Qty: 30 TABLET | Refills: 0 | Status: SHIPPED | OUTPATIENT
Start: 2025-04-10 | End: 2025-05-10

## 2025-02-05 RX ORDER — DEXTROAMPHETAMINE SACCHARATE, AMPHETAMINE ASPARTATE, DEXTROAMPHETAMINE SULFATE AND AMPHETAMINE SULFATE 7.5; 7.5; 7.5; 7.5 MG/1; MG/1; MG/1; MG/1
30 TABLET ORAL DAILY
Qty: 30 TABLET | Refills: 0 | Status: SHIPPED | OUTPATIENT
Start: 2025-03-13 | End: 2025-04-12

## 2025-02-05 RX ORDER — DEXTROAMPHETAMINE SACCHARATE, AMPHETAMINE ASPARTATE MONOHYDRATE, DEXTROAMPHETAMINE SULFATE AND AMPHETAMINE SULFATE 7.5; 7.5; 7.5; 7.5 MG/1; MG/1; MG/1; MG/1
30 CAPSULE, EXTENDED RELEASE ORAL DAILY
Qty: 30 CAPSULE | Refills: 0 | Status: SHIPPED | OUTPATIENT
Start: 2025-02-13 | End: 2025-03-15

## 2025-02-05 ASSESSMENT — PAIN SCALES - GENERAL: PAINLEVEL_OUTOF10: 0-NO PAIN

## 2025-02-05 NOTE — ASSESSMENT & PLAN NOTE
Tolerating and benefiting from current regimen. No indication for medication changes today.      Continue Suboxone 8-2 mg SL TID- refill on file at pharmacy.    Discussed that long term, plan will be to decrease Suboxone dose to within recommended dosing range for tx of OUD (16 mg total daily or less). She has been at current dose for several years, previously managed by an addictions psychiatrist. Will defer changes to dosing today given mutliple psychosocial stressors contributing to risk.    Reviewed UDS completed 12/09/2024- consistent with current rx, no discrepancies.   Has naloxone kit at home.     Reviewed OARRS, no discrepancies or concerns. Discussed risk of motor/cognitive impairment, sedation, dependence, tolerance, abuse, withdrawal sequelae, accidental overdose, life-threatening respiratory depression (nolan. in combination with alcohol, benzodiazepines and/or other opioids), excess sedation in combination with other sedating medicines.

## 2025-02-05 NOTE — PROGRESS NOTES
Outpatient Psychiatry      Subjective   Madhavi Brownlee, is a 44 y.o. female,  seen in follow-up.      Assessment/Plan   Problem List Items Addressed This Visit             ICD-10-CM    ADHD (attention deficit hyperactivity disorder), combined type F90.2     Tolerating and benefiting from current regimen. No indication for medication changes today.  Temporarily switch to Adderall IR BID d/t shortage/lack of availability of XR caps. Will resume usual regimen at next fill.     Continue Adderall XR 30 mg PO daily and Adderall IR 30 mg PO in the afternoon- refills placed on file at pharmacy today.     Reviewed UDS completed 12/09/2024- consistent with current rx, no discrepancies.     Reviewed OARRS, no discrepancies or concerns. Discussed risk of insomnia, anxiety, agitation, anorexia, autonomic effects, toxicity, psychosis, dependence, tolerance, abuse.          Relevant Medications    amphetamine-dextroamphetamine (AdderalL) 30 mg tablet (Start on 2/13/2025)    amphetamine-dextroamphetamine (AdderalL) 30 mg tablet (Start on 3/13/2025)    amphetamine-dextroamphetamine (AdderalL) 30 mg tablet (Start on 4/10/2025)    amphetamine-dextroamphetamine XR (Adderall XR) 30 mg 24 hr capsule (Start on 2/13/2025)    amphetamine-dextroamphetamine XR (Adderall XR) 30 mg 24 hr capsule (Start on 3/13/2025)    amphetamine-dextroamphetamine XR (Adderall XR) 30 mg 24 hr capsule (Start on 4/10/2025)    Moderate tobacco use disorder F17.200     Educated re: the health risks associated with the use of nicotine/tobacco products, and counseled on the importance of cessation. Discussed options for nicotine replacement and/or pharmacotherapies to aid in cessation (e.g. varenicline, bupropion). Not currently interested in cessation assistance.           Opioid use disorder, severe, dependence (Multi) F11.20     Tolerating and benefiting from current regimen. No indication for medication changes today.      Continue Suboxone 8-2 mg SL TID-  "refill on file at pharmacy.    Discussed that long term, plan will be to decrease Suboxone dose to within recommended dosing range for tx of OUD (16 mg total daily or less). She has been at current dose for several years, previously managed by an addictions psychiatrist. Will defer changes to dosing today given mutliple psychosocial stressors contributing to risk.    Reviewed UDS completed 12/09/2024- consistent with current rx, no discrepancies.   Has naloxone kit at home.     Reviewed OARRS, no discrepancies or concerns. Discussed risk of motor/cognitive impairment, sedation, dependence, tolerance, abuse, withdrawal sequelae, accidental overdose, life-threatening respiratory depression (nolan. in combination with alcohol, benzodiazepines and/or other opioids), excess sedation in combination with other sedating medicines.                Follow-up in 3 months    Risk Assessment:  Risk Assessment: Madhavi Brownlee is currently a low acute risk of suicide and self-harm due to no past suicide attempt(s) and not currently endorsing thoughts of suicide. Madhavi Brownlee is currently a low acute risk of violence and harm to others due to no past history of violence and not currently threatening others.  Suicidal Risk Factors:  and multiple family stressors.   Violence Risk Factors: none  Protective Factors: strong coping skills, sense of responsibility towards family, social support/connectedness, moral objections to suicide, child related concerns/living with child <18 years, positive family relationships, hopefulness/future orientation, and marriage/partnership.      Reason for Visit:  Follow-up for medication management.     HPI:  Since her last visit,     \"Things just don't get better...\"  Dad's health has continued to decline.   He's very irritable.  She and daughter have been arguing a lot.   Daughter has not been taking on responsibilities with kids.   2 days in a row she didn't show up to pick kids up from " "school.   Ends up falling on her to do, feels stuck- can't kick daughter out because she worries she'll take the kids, but the current situation isn't working either.     Having sx of perimenopause.  Periods are much shorter, stress incontinence, weight gain, mood swings.   Still working with PCP on dose of levothyroxine, and feels like that is also impacting sx.   Feels like her body is breaking down, and she's too young to be dealing with all of this.   Overwhelmed, frustrated, \"I need a vacation!\"    Has been doing well with medications.   Continues to report benefit from Adderall for inattentive sx.   Does note some disruption to concentration when she's more stressed, and in the context of perimenopausal sx.   Good benefit from Suboxone. No cravings/urges to use opioids. \"There's no way I would ever go back to it... not after seeing what it did to my life... and still seeing what it's doing to my daughter.'    No new concerns today otherwise.     Denies suicidal ideation or a passive death wish.     No new medications or health problems.       Current Psychiatric Medications:  Suboxone 8-2 mg SL TID  Adderall XR 30 mg PO daily  Adderall IR 30 mg PO daily in the afternoon      Record Review: brief     Medical Review Of Systems:  Pertinent items are noted in HPI.    Psychiatric Review Of Systems:  As noted in HPI.        Objective   Mental Status Exam  General Appearance: Well groomed, appropriate eye contact  Attitude/Behavior: Cooperative  Motor: No psychomotor agitation or retardation, no tremor or other abnormal movements  Speech: Normal rate, volume, prosody  Gait/Station: Other:(comment)  Mood: \"Things are just stressful.\"  Affect: Congruent with mood and topic of conversation  Thought Process: Linear, goal directed  Thought Associations: No loosening of associations  Thought Content: Other: (comment) (anxious themes)  Perception: No perceptual abnormalities noted  Sensorium: Alert and oriented to person, " place, time and situation  Insight: Intact  Judgement: Intact  Cognition: Cognitively intact to conversational testing with respect to attention, orientation, fund of knowledge, recent and remote memory, and language    Vitals:  Vitals:    02/05/25 0843   BP: (!) 144/92   Pulse: 79   Resp: 18   Temp: 36.6 °C (97.8 °F)       Labs:  Lab on 12/09/2024   Component Date Value    Thyroid Stimulating Horm* 12/09/2024 13.67 (H)     Glucose 12/09/2024 116 (H)     Sodium 12/09/2024 138     Potassium 12/09/2024 4.0     Chloride 12/09/2024 105     Bicarbonate 12/09/2024 27     Anion Gap 12/09/2024 10     Urea Nitrogen 12/09/2024 11     Creatinine 12/09/2024 0.77     eGFR 12/09/2024 >90     Calcium 12/09/2024 8.4 (L)     Albumin 12/09/2024 3.7     Alkaline Phosphatase 12/09/2024 71     Total Protein 12/09/2024 6.7     AST 12/09/2024 16     Bilirubin, Total 12/09/2024 0.2     ALT 12/09/2024 18     BUPRENORPHINE GLUC, URINE 12/09/2024 >1000     BUPRENORPHINE ,URINE 12/09/2024 12     NALOXONE, URINE 12/09/2024 <100     NORBUPRENORPHINE GLUC,UR* 12/09/2024 >1000     NORBUPRENORPHINE, URINE 12/09/2024 763     Methamphetamine Quant, Ur 12/09/2024 <200     MDA, Urine 12/09/2024 <200     MDEA, Urine 12/09/2024 <200     Phentermine,Urine 12/09/2024 <200     Amphetamines,Urine 12/09/2024 >5000     MDMA, Urine 12/09/2024 <200     Creatinine, Urine Random 12/09/2024 241.0     Amphetamine Screen, Urine 12/09/2024 Presumptive Positive (A)     Barbiturate Screen, Urine 12/09/2024 Presumptive Negative     Cannabinoid Screen, Urine 12/09/2024 Presumptive Negative     Cocaine Metabolite Scree* 12/09/2024 Presumptive Negative     PCP Screen, Urine 12/09/2024 Presumptive Negative     Clonazepam 12/09/2024 <25     7-Aminoclonazepam 12/09/2024 <25     Alprazolam 12/09/2024 <25     Alpha-Hydroxyalprazolam 12/09/2024 <25     Midazolam 12/09/2024 <25     Alpha-Hydroxymidazolam 12/09/2024 <25     Chlordiazepoxide 12/09/2024 <25     Diazepam 12/09/2024  <25     Nordiazepam 12/09/2024 <25     Temazepam 12/09/2024 <25     Oxazepam 12/09/2024 <25     Lorazepam 12/09/2024 <25     Methadone 12/09/2024 <25     EDDP 12/09/2024 <25     6-Acetylmorphine 12/09/2024 <25     Codeine 12/09/2024 <50     Hydrocodone 12/09/2024 <25     Hydromorphone 12/09/2024 <25     Morphine  12/09/2024 <50     Norhydrocodone 12/09/2024 <25     Noroxycodone 12/09/2024 <25     Oxycodone 12/09/2024 <25     Oxymorphone 12/09/2024 <25     Fentanyl 12/09/2024 <2.5     Norfentanyl 12/09/2024 <2.5     Tramadol 12/09/2024 <50     O-Desmethyltramadol 12/09/2024 <50     Zolpidem 12/09/2024 <25     Zolpidem Metabolite (ZCA) 12/09/2024 <25     Thyroxine, Free 12/09/2024 0.95            Brandi Howard, APRN-CNP, APRN-CNS

## 2025-02-05 NOTE — ASSESSMENT & PLAN NOTE
Tolerating and benefiting from current regimen. No indication for medication changes today.  Temporarily switch to Adderall IR BID d/t shortage/lack of availability of XR caps. Will resume usual regimen at next fill.     Continue Adderall XR 30 mg PO daily and Adderall IR 30 mg PO in the afternoon- refills placed on file at pharmacy today.     Reviewed UDS completed 12/09/2024- consistent with current rx, no discrepancies.     Reviewed OARRS, no discrepancies or concerns. Discussed risk of insomnia, anxiety, agitation, anorexia, autonomic effects, toxicity, psychosis, dependence, tolerance, abuse.

## 2025-02-25 DIAGNOSIS — F11.20 OPIOID USE DISORDER, SEVERE, DEPENDENCE (MULTI): ICD-10-CM

## 2025-02-25 RX ORDER — BUPRENORPHINE HYDROCHLORIDE, NALOXONE HYDROCHLORIDE 8; 2 MG/1; MG/1
1 FILM, SOLUBLE BUCCAL; SUBLINGUAL 3 TIMES DAILY
Qty: 90 FILM | Refills: 2 | Status: SHIPPED | OUTPATIENT
Start: 2025-03-14 | End: 2025-06-12

## 2025-04-12 LAB
T4 FREE SERPL-MCNC: 1.1 NG/DL (ref 0.8–1.8)
TSH SERPL-ACNC: 19.04 MIU/L

## 2025-04-17 ENCOUNTER — PATIENT MESSAGE (OUTPATIENT)
Dept: PRIMARY CARE | Facility: CLINIC | Age: 45
End: 2025-04-17
Payer: COMMERCIAL

## 2025-04-17 DIAGNOSIS — E03.9 HYPOTHYROIDISM, UNSPECIFIED TYPE: Primary | ICD-10-CM

## 2025-04-21 RX ORDER — LEVOTHYROXINE SODIUM 100 UG/1
100 TABLET ORAL DAILY
Qty: 30 TABLET | Refills: 3 | Status: SHIPPED | OUTPATIENT
Start: 2025-04-21 | End: 2026-04-21

## 2025-05-07 ENCOUNTER — APPOINTMENT (OUTPATIENT)
Dept: BEHAVIORAL HEALTH | Facility: CLINIC | Age: 45
End: 2025-05-07
Payer: COMMERCIAL

## 2025-05-09 ENCOUNTER — PATIENT MESSAGE (OUTPATIENT)
Dept: BEHAVIORAL HEALTH | Facility: CLINIC | Age: 45
End: 2025-05-09
Payer: COMMERCIAL

## 2025-05-09 DIAGNOSIS — F90.2 ADHD (ATTENTION DEFICIT HYPERACTIVITY DISORDER), COMBINED TYPE: ICD-10-CM

## 2025-05-09 DIAGNOSIS — F11.20 OPIOID USE DISORDER, SEVERE, DEPENDENCE (MULTI): ICD-10-CM

## 2025-05-09 RX ORDER — DEXTROAMPHETAMINE SACCHARATE, AMPHETAMINE ASPARTATE MONOHYDRATE, DEXTROAMPHETAMINE SULFATE AND AMPHETAMINE SULFATE 7.5; 7.5; 7.5; 7.5 MG/1; MG/1; MG/1; MG/1
30 CAPSULE, EXTENDED RELEASE ORAL DAILY
Qty: 7 CAPSULE | Refills: 0 | Status: SHIPPED | OUTPATIENT
Start: 2025-05-09 | End: 2025-05-16

## 2025-05-09 RX ORDER — DEXTROAMPHETAMINE SACCHARATE, AMPHETAMINE ASPARTATE, DEXTROAMPHETAMINE SULFATE AND AMPHETAMINE SULFATE 7.5; 7.5; 7.5; 7.5 MG/1; MG/1; MG/1; MG/1
30 TABLET ORAL DAILY
Qty: 7 TABLET | Refills: 0 | Status: SHIPPED | OUTPATIENT
Start: 2025-05-09 | End: 2025-05-16

## 2025-05-09 RX ORDER — BUPRENORPHINE HYDROCHLORIDE, NALOXONE HYDROCHLORIDE 8; 2 MG/1; MG/1
1 FILM, SOLUBLE BUCCAL; SUBLINGUAL 3 TIMES DAILY
Qty: 21 FILM | Refills: 0 | Status: SHIPPED | OUTPATIENT
Start: 2025-05-09 | End: 2025-05-16

## 2025-05-14 ENCOUNTER — APPOINTMENT (OUTPATIENT)
Dept: BEHAVIORAL HEALTH | Facility: CLINIC | Age: 45
End: 2025-05-14
Payer: COMMERCIAL

## 2025-05-14 VITALS
DIASTOLIC BLOOD PRESSURE: 80 MMHG | WEIGHT: 182.2 LBS | SYSTOLIC BLOOD PRESSURE: 126 MMHG | BODY MASS INDEX: 30.32 KG/M2 | HEART RATE: 88 BPM

## 2025-05-14 DIAGNOSIS — Z00.00 HEALTHCARE MAINTENANCE: ICD-10-CM

## 2025-05-14 DIAGNOSIS — Z79.899 MEDICATION MANAGEMENT: ICD-10-CM

## 2025-05-14 DIAGNOSIS — F11.20 OPIOID USE DISORDER, SEVERE, DEPENDENCE (MULTI): ICD-10-CM

## 2025-05-14 DIAGNOSIS — F17.200 MODERATE TOBACCO USE DISORDER: ICD-10-CM

## 2025-05-14 DIAGNOSIS — F90.2 ADHD (ATTENTION DEFICIT HYPERACTIVITY DISORDER), COMBINED TYPE: ICD-10-CM

## 2025-05-14 PROCEDURE — 99214 OFFICE O/P EST MOD 30 MIN: CPT | Performed by: CLINICAL NURSE SPECIALIST

## 2025-05-14 RX ORDER — BUPRENORPHINE HYDROCHLORIDE, NALOXONE HYDROCHLORIDE 8; 2 MG/1; MG/1
1 FILM, SOLUBLE BUCCAL; SUBLINGUAL 3 TIMES DAILY
Qty: 69 FILM | Refills: 0 | Status: SHIPPED | OUTPATIENT
Start: 2025-05-16 | End: 2025-06-08

## 2025-05-14 RX ORDER — DEXTROAMPHETAMINE SACCHARATE, AMPHETAMINE ASPARTATE, DEXTROAMPHETAMINE SULFATE AND AMPHETAMINE SULFATE 7.5; 7.5; 7.5; 7.5 MG/1; MG/1; MG/1; MG/1
30 TABLET ORAL DAILY
Qty: 30 TABLET | Refills: 0 | Status: SHIPPED | OUTPATIENT
Start: 2025-07-06 | End: 2025-08-05

## 2025-05-14 RX ORDER — DEXTROAMPHETAMINE SACCHARATE, AMPHETAMINE ASPARTATE, DEXTROAMPHETAMINE SULFATE AND AMPHETAMINE SULFATE 7.5; 7.5; 7.5; 7.5 MG/1; MG/1; MG/1; MG/1
30 TABLET ORAL DAILY
Qty: 30 TABLET | Refills: 0 | Status: SHIPPED | OUTPATIENT
Start: 2025-06-08 | End: 2025-07-08

## 2025-05-14 RX ORDER — DEXTROAMPHETAMINE SACCHARATE, AMPHETAMINE ASPARTATE, DEXTROAMPHETAMINE SULFATE AND AMPHETAMINE SULFATE 7.5; 7.5; 7.5; 7.5 MG/1; MG/1; MG/1; MG/1
30 TABLET ORAL DAILY
Qty: 23 TABLET | Refills: 0 | Status: SHIPPED | OUTPATIENT
Start: 2025-05-16 | End: 2025-06-08

## 2025-05-14 RX ORDER — DEXTROAMPHETAMINE SACCHARATE, AMPHETAMINE ASPARTATE MONOHYDRATE, DEXTROAMPHETAMINE SULFATE AND AMPHETAMINE SULFATE 7.5; 7.5; 7.5; 7.5 MG/1; MG/1; MG/1; MG/1
30 CAPSULE, EXTENDED RELEASE ORAL DAILY
Qty: 30 CAPSULE | Refills: 0 | Status: SHIPPED | OUTPATIENT
Start: 2025-07-06 | End: 2025-08-05

## 2025-05-14 RX ORDER — DEXTROAMPHETAMINE SACCHARATE, AMPHETAMINE ASPARTATE MONOHYDRATE, DEXTROAMPHETAMINE SULFATE AND AMPHETAMINE SULFATE 7.5; 7.5; 7.5; 7.5 MG/1; MG/1; MG/1; MG/1
30 CAPSULE, EXTENDED RELEASE ORAL DAILY
Qty: 23 CAPSULE | Refills: 0 | Status: SHIPPED | OUTPATIENT
Start: 2025-05-16 | End: 2025-06-08

## 2025-05-14 RX ORDER — BUPRENORPHINE HYDROCHLORIDE, NALOXONE HYDROCHLORIDE 8; 2 MG/1; MG/1
1 FILM, SOLUBLE BUCCAL; SUBLINGUAL 3 TIMES DAILY
Qty: 90 FILM | Refills: 2 | Status: SHIPPED | OUTPATIENT
Start: 2025-06-08 | End: 2025-09-06

## 2025-05-14 RX ORDER — DEXTROAMPHETAMINE SACCHARATE, AMPHETAMINE ASPARTATE MONOHYDRATE, DEXTROAMPHETAMINE SULFATE AND AMPHETAMINE SULFATE 7.5; 7.5; 7.5; 7.5 MG/1; MG/1; MG/1; MG/1
30 CAPSULE, EXTENDED RELEASE ORAL DAILY
Qty: 30 CAPSULE | Refills: 0 | Status: SHIPPED | OUTPATIENT
Start: 2025-06-08 | End: 2025-07-08

## 2025-05-14 RX ORDER — DEXTROAMPHETAMINE SACCHARATE, AMPHETAMINE ASPARTATE, DEXTROAMPHETAMINE SULFATE AND AMPHETAMINE SULFATE 7.5; 7.5; 7.5; 7.5 MG/1; MG/1; MG/1; MG/1
30 TABLET ORAL DAILY
Qty: 30 TABLET | Refills: 0 | Status: SHIPPED | OUTPATIENT
Start: 2025-08-03 | End: 2025-09-02

## 2025-05-14 RX ORDER — DEXTROAMPHETAMINE SACCHARATE, AMPHETAMINE ASPARTATE MONOHYDRATE, DEXTROAMPHETAMINE SULFATE AND AMPHETAMINE SULFATE 7.5; 7.5; 7.5; 7.5 MG/1; MG/1; MG/1; MG/1
30 CAPSULE, EXTENDED RELEASE ORAL DAILY
Qty: 30 CAPSULE | Refills: 0 | Status: SHIPPED | OUTPATIENT
Start: 2025-08-03 | End: 2025-09-02

## 2025-05-14 NOTE — ASSESSMENT & PLAN NOTE
Tolerating and benefiting from current regimen. No indication for medication changes today.      Continue Adderall XR 30 mg PO daily and Adderall IR 30 mg PO in the afternoon- refills placed on file at pharmacy today. (1 refill for 23 day supply to keep rx's aligned with refill schedule for all other medications she fills at the same pharmacy, all others for usual 30-day rx)    Reviewed UDS completed 12/09/2024- consistent with current rx, no discrepancies. Orders for repeat UDS placed today, to be completed prior to next visit.     Reviewed OARRS, no discrepancies or concerns. Discussed risk of insomnia, anxiety, agitation, anorexia, autonomic effects, toxicity, psychosis, dependence, tolerance, abuse.

## 2025-05-14 NOTE — PROGRESS NOTES
[facility] Nurse Note    Name: Madhavi Brownlee  MRN: [id number]  YOB: 1980    Date: 05/14/25    Reason for Visit:  No chief complaint on file.    Vitals:       Problem List[1]    Allergies[2]    Encounter Medications[3]    Progress:             [1]   Patient Active Problem List  Diagnosis    ADHD (attention deficit hyperactivity disorder), combined type    Femoral acetabular impingement    Insomnia due to other mental disorder    Joint pain, hip    Moderate tobacco use disorder    Opioid use disorder, severe, dependence (Multi)    Trochanteric bursitis of right hip    Mass of upper outer quadrant of left breast    Encounter for tobacco use cessation counseling    Healthcare maintenance   [2]   Allergies  Allergen Reactions    Codeine Unknown   [3]   Outpatient Encounter Medications as of 5/14/2025   Medication Sig Dispense Refill    amphetamine-dextroamphetamine (AdderalL) 30 mg tablet Take 1 tablet (30 mg) by mouth once daily. Do not fill before March 13, 2025. 30 tablet 0    amphetamine-dextroamphetamine (AdderalL) 30 mg tablet Take 1 tablet (30 mg) by mouth once daily. Do not fill before April 10, 2025. 30 tablet 0    amphetamine-dextroamphetamine (AdderalL) 30 mg tablet Take 1 tablet (30 mg) by mouth once daily for 7 days. 7 tablet 0    amphetamine-dextroamphetamine XR (Adderall XR) 30 mg 24 hr capsule Take 1 capsule (30 mg) by mouth once daily. Do not crush or chew. Do not fill before March 13, 2025. 30 capsule 0    amphetamine-dextroamphetamine XR (Adderall XR) 30 mg 24 hr capsule Take 1 capsule (30 mg) by mouth once daily. Do not crush or chew. Do not fill before April 10, 2025. 30 capsule 0    amphetamine-dextroamphetamine XR (Adderall XR) 30 mg 24 hr capsule Take 1 capsule (30 mg) by mouth once daily for 7 days. Do not crush or chew. 7 capsule 0    ibuprofen 600 mg tablet Take 1 tablet (600 mg) by mouth every 8 hours if needed.      levothyroxine (Synthroid, Levoxyl) 100 mcg tablet Take 1 tablet (100  mcg) by mouth early in the morning.. Take on an empty stomach at the same time each day, either 30 to 60 minutes prior to breakfast 30 tablet 3    levothyroxine (Synthroid, Levoxyl) 88 mcg tablet Take 1 tablet (88 mcg) by mouth once daily. 90 tablet 1    naloxone (Narcan) 4 mg/0.1 mL nasal spray Administer into affected nostril(s).      Nicoderm CQ 21 mg/24 hr patch apply 1 patch to CLEAN, DRY, AND INTACT SKIN once daily REMOVE ev...  (REFER TO PRESCRIPTION NOTES).      nicotine polacrilex (Nicorette) 4 mg gum Chew 1 each (4 mg) if needed for smoking cessation. 100 each 1    Suboxone 8-2 mg SL film Place 1 Film under the tongue 3 times a day for 7 days. Place 1 Film under the tongue 3 times a day. 21 Film 0    [DISCONTINUED] amphetamine-dextroamphetamine (AdderalL) 30 mg tablet Take 1 tablet (30 mg) by mouth once daily. Do not fill before February 13, 2025. 30 tablet 0    [DISCONTINUED] amphetamine-dextroamphetamine XR (Adderall XR) 30 mg 24 hr capsule Take 1 capsule (30 mg) by mouth once daily. Do not crush or chew. Do not fill before February 13, 2025. 30 capsule 0    [DISCONTINUED] Suboxone 8-2 mg SL film Place 1 Film under the tongue 3 times a day. Place 1 Film under the tongue 3 times a day. Do not fill before March 14, 2025. 90 Film 2     No facility-administered encounter medications on file as of 5/14/2025.

## 2025-05-14 NOTE — PROGRESS NOTES
Outpatient Psychiatry      Subjective   Madhavi Brownlee, is a 44 y.o. female,  seen in follow-up.      Assessment/Plan   Problem List Items Addressed This Visit           ICD-10-CM    ADHD (attention deficit hyperactivity disorder), combined type F90.2    Tolerating and benefiting from current regimen. No indication for medication changes today.      Continue Adderall XR 30 mg PO daily and Adderall IR 30 mg PO in the afternoon- refills placed on file at pharmacy today. (1 refill for 23 day supply to keep rx's aligned with refill schedule for all other medications she fills at the same pharmacy, all others for usual 30-day rx)    Reviewed UDS completed 12/09/2024- consistent with current rx, no discrepancies. Orders for repeat UDS placed today, to be completed prior to next visit.     Reviewed OARRS, no discrepancies or concerns. Discussed risk of insomnia, anxiety, agitation, anorexia, autonomic effects, toxicity, psychosis, dependence, tolerance, abuse.          Relevant Medications    amphetamine-dextroamphetamine (AdderalL) 30 mg tablet (Start on 5/16/2025)    amphetamine-dextroamphetamine XR (Adderall XR) 30 mg 24 hr capsule (Start on 7/6/2025)    amphetamine-dextroamphetamine (AdderalL) 30 mg tablet (Start on 6/8/2025)    amphetamine-dextroamphetamine XR (Adderall XR) 30 mg 24 hr capsule (Start on 8/3/2025)    amphetamine-dextroamphetamine (AdderalL) 30 mg tablet (Start on 7/6/2025)    amphetamine-dextroamphetamine (AdderalL) 30 mg tablet (Start on 8/3/2025)    amphetamine-dextroamphetamine XR (Adderall XR) 30 mg 24 hr capsule (Start on 5/16/2025)    amphetamine-dextroamphetamine XR (Adderall XR) 30 mg 24 hr capsule (Start on 6/8/2025)    Moderate tobacco use disorder F17.200    Educated re: the health risks associated with the use of nicotine/tobacco products, and counseled on the importance of cessation. Discussed options for nicotine replacement and/or pharmacotherapies to aid in cessation (e.g.  varenicline, bupropion). Not currently interested in cessation assistance.           Opioid use disorder, severe, dependence (Multi) F11.20    Tolerating and benefiting from current regimen. No indication for medication changes today.      Continue Suboxone 8-2 mg SL TID- refills placed on file at pharmacy today. (1 refill for 23 day supply to keep rx's aligned with refill schedule for all other medications she fills at the same pharmacy, all others for usual 30-day rx)    Discussed that long term, plan will be to decrease Suboxone dose to within recommended dosing range for tx of OUD (16 mg total daily or less). She has been at current dose for several years, previously managed by an addictions psychiatrist. Will defer changes to dosing today given mutliple psychosocial stressors contributing to risk.    Reviewed UDS completed 12/09/2024- consistent with current rx, no discrepancies. Orders for repeat UDS placed today, to be completed prior to next visit.   Has naloxone kit at home.     Reviewed OARRS, no discrepancies or concerns. Discussed risk of motor/cognitive impairment, sedation, dependence, tolerance, abuse, withdrawal sequelae, accidental overdose, life-threatening respiratory depression (nolan. in combination with alcohol, benzodiazepines and/or other opioids), excess sedation in combination with other sedating medicines.             Relevant Medications    Suboxone 8-2 mg SL film (Start on 5/16/2025)    Suboxone 8-2 mg SL film (Start on 6/8/2025)    Healthcare maintenance Z00.00    Relevant Orders    Buprenorphine Confirm,Urine    Opiate/Opioid/Benzo Prescription Compliance    Amphetamine Confirm, Urine     Other Visit Diagnoses         Codes      Medication management     Z79.899    Relevant Orders    Buprenorphine Confirm,Urine    Opiate/Opioid/Benzo Prescription Compliance    Amphetamine Confirm, Urine            Follow-up in 3 months.     Risk Assessment:  Risk Assessment: Madhavi Brownlee is currently  "a low acute risk of suicide and self-harm due to no past suicide attempt(s) and not currently endorsing thoughts of suicide. Madhavi Brownlee is currently a low acute risk of violence and harm to others due to no past history of violence and not currently threatening others.  Suicidal Risk Factors:  and multiple family stressors.   Violence Risk Factors: none  Protective Factors: strong coping skills, sense of responsibility towards family, social support/connectedness, moral objections to suicide, child related concerns/living with child <18 years, positive family relationships, hopefulness/future orientation, and marriage/partnership.    Reason for Visit:  Follow-up for medication management.     HPI:  Since her last visit,     Things have been, \"the same.\"   \"More to my breaking point than I have been in the past.\"   Grandson is getting in trouble a lot- not in school, but out of school. Worried about it eventually happening at school too.  Experiencing a lot of anger issues. Seeing counselors both in school and outside of school.   Was planning a family vacation to [place], but on the condition that grandkids did well and behaved well until the end of the school year.   Feels like at this point it's not even something she could enjoy.     Irritable lately, \"I've been mean to everyone.\"  Feels burnt out, no one is really helping her, doing everything on her own.   Even when she asks for help, feels like it's more work than just doing it on her own.     Last visit had to be rescheduled- covering provider rx'd 1 week supply of medications.   Picks up all meds for herself and daughter on the same day every month.   Wondering if rx can be aligned with other refills again.     Denies suicidal ideation or a passive death wish.     Synthroid dose inc'd to 100 mcg. Otherwise, no new medications or health problems.       Current Psychiatric Medications:  Suboxone 8-2 mg SL TID  Adderall XR 30 mg PO " "daily  Adderall IR 30 mg PO daily in the afternoon      Record Review: moderate     Medical Review Of Systems:  Pertinent items are noted in HPI.    Psychiatric Review Of Systems:  As noted in HPI.        Objective   Mental Status Exam  General Appearance: Well groomed, appropriate eye contact  Attitude/Behavior: Cooperative  Motor: No psychomotor agitation or retardation, no tremor or other abnormal movements  Speech: Normal rate, volume, prosody  Gait/Station: WFL - Within functional limits  Mood: \"The same.\"  Affect: Congruent with mood and topic of conversation  Thought Process: Linear, goal directed  Thought Associations: No loosening of associations  Thought Content: Other: (comment) (anxious and depressive themes 2/2 multiple psychoscial stressors.)  Perception: No perceptual abnormalities noted  Sensorium: Alert and oriented to person, place, time and situation  Insight: Intact  Judgement: Intact  Cognition: Cognitively intact to conversational testing with respect to attention, orientation, fund of knowledge, recent and remote memory, and language    Vitals:  Vitals:    05/14/25 0846   BP: 126/80   Pulse: 88       Labs:  not applicable        Brandi Howard, APRN-CNP, APRN-CNS    "

## 2025-05-14 NOTE — ASSESSMENT & PLAN NOTE
Tolerating and benefiting from current regimen. No indication for medication changes today.      Continue Suboxone 8-2 mg SL TID- refills placed on file at pharmacy today. (1 refill for 23 day supply to keep rx's aligned with refill schedule for all other medications she fills at the same pharmacy, all others for usual 30-day rx)    Discussed that long term, plan will be to decrease Suboxone dose to within recommended dosing range for tx of OUD (16 mg total daily or less). She has been at current dose for several years, previously managed by an addictions psychiatrist. Will defer changes to dosing today given mutliple psychosocial stressors contributing to risk.    Reviewed UDS completed 12/09/2024- consistent with current rx, no discrepancies. Orders for repeat UDS placed today, to be completed prior to next visit.   Has naloxone kit at home.     Reviewed OARRS, no discrepancies or concerns. Discussed risk of motor/cognitive impairment, sedation, dependence, tolerance, abuse, withdrawal sequelae, accidental overdose, life-threatening respiratory depression (nolan. in combination with alcohol, benzodiazepines and/or other opioids), excess sedation in combination with other sedating medicines.

## 2025-07-15 LAB
T4 FREE SERPL-MCNC: 1.2 NG/DL (ref 0.8–1.8)
TSH SERPL-ACNC: 14.25 MIU/L

## 2025-07-31 ENCOUNTER — APPOINTMENT (OUTPATIENT)
Dept: PRIMARY CARE | Facility: CLINIC | Age: 45
End: 2025-07-31
Payer: COMMERCIAL

## 2025-08-06 ENCOUNTER — APPOINTMENT (OUTPATIENT)
Dept: BEHAVIORAL HEALTH | Facility: CLINIC | Age: 45
End: 2025-08-06
Payer: COMMERCIAL

## 2025-08-06 DIAGNOSIS — F90.2 ADHD (ATTENTION DEFICIT HYPERACTIVITY DISORDER), COMBINED TYPE: ICD-10-CM

## 2025-08-06 DIAGNOSIS — F17.200 MODERATE TOBACCO USE DISORDER: ICD-10-CM

## 2025-08-06 DIAGNOSIS — F11.20 OPIOID USE DISORDER, SEVERE, DEPENDENCE (MULTI): ICD-10-CM

## 2025-08-06 PROCEDURE — 99214 OFFICE O/P EST MOD 30 MIN: CPT | Performed by: CLINICAL NURSE SPECIALIST

## 2025-08-06 RX ORDER — DEXTROAMPHETAMINE SACCHARATE, AMPHETAMINE ASPARTATE, DEXTROAMPHETAMINE SULFATE AND AMPHETAMINE SULFATE 7.5; 7.5; 7.5; 7.5 MG/1; MG/1; MG/1; MG/1
30 TABLET ORAL DAILY
Qty: 30 TABLET | Refills: 0 | Status: SHIPPED | OUTPATIENT
Start: 2025-10-01 | End: 2025-10-31

## 2025-08-06 RX ORDER — DEXTROAMPHETAMINE SACCHARATE, AMPHETAMINE ASPARTATE, DEXTROAMPHETAMINE SULFATE AND AMPHETAMINE SULFATE 7.5; 7.5; 7.5; 7.5 MG/1; MG/1; MG/1; MG/1
30 TABLET ORAL DAILY
Qty: 30 TABLET | Refills: 0 | Status: SHIPPED | OUTPATIENT
Start: 2025-08-06 | End: 2025-09-05

## 2025-08-06 RX ORDER — DEXTROAMPHETAMINE SACCHARATE, AMPHETAMINE ASPARTATE, DEXTROAMPHETAMINE SULFATE AND AMPHETAMINE SULFATE 7.5; 7.5; 7.5; 7.5 MG/1; MG/1; MG/1; MG/1
30 TABLET ORAL DAILY
Qty: 30 TABLET | Refills: 0 | Status: SHIPPED | OUTPATIENT
Start: 2025-09-03 | End: 2025-10-03

## 2025-08-11 ASSESSMENT — PROMIS GLOBAL HEALTH SCALE
RATE_SOCIAL_SATISFACTION: FAIR
RATE_MENTAL_HEALTH: EXCELLENT
CARRYOUT_PHYSICAL_ACTIVITIES: MOSTLY
EMOTIONAL_PROBLEMS: SOMETIMES
RATE_AVERAGE_PAIN: 5
CARRYOUT_SOCIAL_ACTIVITIES: GOOD
RATE_AVERAGE_FATIGUE: MODERATE
RATE_QUALITY_OF_LIFE: FAIR
RATE_PHYSICAL_HEALTH: GOOD
RATE_GENERAL_HEALTH: FAIR

## 2025-08-15 ENCOUNTER — APPOINTMENT (OUTPATIENT)
Dept: PRIMARY CARE | Facility: CLINIC | Age: 45
End: 2025-08-15
Payer: COMMERCIAL

## 2025-08-15 VITALS
TEMPERATURE: 97.9 F | DIASTOLIC BLOOD PRESSURE: 72 MMHG | HEART RATE: 78 BPM | SYSTOLIC BLOOD PRESSURE: 128 MMHG | BODY MASS INDEX: 29.52 KG/M2 | WEIGHT: 177.2 LBS | RESPIRATION RATE: 14 BRPM | HEIGHT: 65 IN | OXYGEN SATURATION: 97 %

## 2025-08-15 DIAGNOSIS — J20.9 ACUTE BRONCHITIS, UNSPECIFIED ORGANISM: ICD-10-CM

## 2025-08-15 DIAGNOSIS — F90.2 ADHD (ATTENTION DEFICIT HYPERACTIVITY DISORDER), COMBINED TYPE: ICD-10-CM

## 2025-08-15 DIAGNOSIS — F17.200 MODERATE TOBACCO USE DISORDER: ICD-10-CM

## 2025-08-15 DIAGNOSIS — R06.09 DOE (DYSPNEA ON EXERTION): ICD-10-CM

## 2025-08-15 DIAGNOSIS — E03.9 HYPOTHYROIDISM, UNSPECIFIED TYPE: Primary | ICD-10-CM

## 2025-08-15 DIAGNOSIS — Z09 HOSPITAL DISCHARGE FOLLOW-UP: ICD-10-CM

## 2025-08-15 LAB
ALBUMIN SERPL-MCNC: 4.1 G/DL (ref 3.6–5.1)
ALP SERPL-CCNC: 74 U/L (ref 31–125)
ALT SERPL-CCNC: 18 U/L (ref 6–29)
ANION GAP SERPL CALCULATED.4IONS-SCNC: 9 MMOL/L (CALC) (ref 7–17)
AST SERPL-CCNC: 12 U/L (ref 10–35)
BILIRUB SERPL-MCNC: 0.4 MG/DL (ref 0.2–1.2)
BUN SERPL-MCNC: 13 MG/DL (ref 7–25)
CALCIUM SERPL-MCNC: 9.1 MG/DL (ref 8.6–10.2)
CHLORIDE SERPL-SCNC: 104 MMOL/L (ref 98–110)
CHOLEST SERPL-MCNC: 169 MG/DL
CHOLEST/HDLC SERPL: 3.7 (CALC)
CO2 SERPL-SCNC: 26 MMOL/L (ref 20–32)
CREAT SERPL-MCNC: 0.9 MG/DL (ref 0.5–0.99)
EGFRCR SERPLBLD CKD-EPI 2021: 80 ML/MIN/1.73M2
GLUCOSE SERPL-MCNC: 126 MG/DL (ref 65–139)
HDLC SERPL-MCNC: 46 MG/DL
LDLC SERPL CALC-MCNC: 105 MG/DL (CALC)
NONHDLC SERPL-MCNC: 123 MG/DL (CALC)
POTASSIUM SERPL-SCNC: 4.1 MMOL/L (ref 3.5–5.3)
PROT SERPL-MCNC: 7.1 G/DL (ref 6.1–8.1)
SODIUM SERPL-SCNC: 139 MMOL/L (ref 135–146)
T4 FREE SERPL-MCNC: 1.5 NG/DL (ref 0.8–1.8)
TRIGL SERPL-MCNC: 87 MG/DL
TSH SERPL-ACNC: 9.46 MIU/L

## 2025-08-15 PROCEDURE — 3008F BODY MASS INDEX DOCD: CPT | Performed by: FAMILY MEDICINE

## 2025-08-15 PROCEDURE — 99214 OFFICE O/P EST MOD 30 MIN: CPT | Performed by: FAMILY MEDICINE

## 2025-08-15 RX ORDER — BUDESONIDE AND FORMOTEROL FUMARATE DIHYDRATE 80; 4.5 UG/1; UG/1
2 AEROSOL RESPIRATORY (INHALATION) 2 TIMES DAILY
COMMUNITY
Start: 2025-08-03

## 2025-08-15 RX ORDER — PREDNISONE 20 MG/1
TABLET ORAL
COMMUNITY
Start: 2025-08-04 | End: 2025-08-15 | Stop reason: WASHOUT

## 2025-08-15 ASSESSMENT — PAIN SCALES - GENERAL: PAINLEVEL_OUTOF10: 0-NO PAIN

## 2025-08-19 DIAGNOSIS — F11.20 OPIOID USE DISORDER, SEVERE, DEPENDENCE (MULTI): ICD-10-CM

## 2025-08-19 DIAGNOSIS — F90.2 ADHD (ATTENTION DEFICIT HYPERACTIVITY DISORDER), COMBINED TYPE: ICD-10-CM

## 2025-08-19 RX ORDER — BUPRENORPHINE HYDROCHLORIDE, NALOXONE HYDROCHLORIDE 8; 2 MG/1; MG/1
1 FILM, SOLUBLE BUCCAL; SUBLINGUAL 3 TIMES DAILY
Qty: 90 FILM | Refills: 2 | Status: SHIPPED | OUTPATIENT
Start: 2025-09-04 | End: 2025-12-03

## 2025-08-19 RX ORDER — DEXTROAMPHETAMINE SACCHARATE, AMPHETAMINE ASPARTATE MONOHYDRATE, DEXTROAMPHETAMINE SULFATE AND AMPHETAMINE SULFATE 7.5; 7.5; 7.5; 7.5 MG/1; MG/1; MG/1; MG/1
30 CAPSULE, EXTENDED RELEASE ORAL DAILY
Qty: 30 CAPSULE | Refills: 0 | Status: SHIPPED | OUTPATIENT
Start: 2025-09-03 | End: 2025-10-03

## 2025-08-19 RX ORDER — DEXTROAMPHETAMINE SACCHARATE, AMPHETAMINE ASPARTATE MONOHYDRATE, DEXTROAMPHETAMINE SULFATE AND AMPHETAMINE SULFATE 7.5; 7.5; 7.5; 7.5 MG/1; MG/1; MG/1; MG/1
30 CAPSULE, EXTENDED RELEASE ORAL DAILY
Qty: 30 CAPSULE | Refills: 0 | Status: SHIPPED | OUTPATIENT
Start: 2025-10-01 | End: 2025-10-31

## 2025-09-04 DIAGNOSIS — E03.9 HYPOTHYROIDISM, UNSPECIFIED TYPE: ICD-10-CM

## 2025-09-04 RX ORDER — LEVOTHYROXINE SODIUM 100 UG/1
TABLET ORAL
Qty: 30 TABLET | Refills: 3 | Status: SHIPPED | OUTPATIENT
Start: 2025-09-04

## 2025-10-29 ENCOUNTER — APPOINTMENT (OUTPATIENT)
Dept: BEHAVIORAL HEALTH | Facility: CLINIC | Age: 45
End: 2025-10-29
Payer: COMMERCIAL